# Patient Record
Sex: FEMALE | Race: WHITE | NOT HISPANIC OR LATINO | URBAN - METROPOLITAN AREA
[De-identification: names, ages, dates, MRNs, and addresses within clinical notes are randomized per-mention and may not be internally consistent; named-entity substitution may affect disease eponyms.]

---

## 2017-08-21 ENCOUNTER — INPATIENT (INPATIENT)
Facility: HOSPITAL | Age: 77
LOS: 18 days | Discharge: HOME | End: 2017-09-09
Attending: INTERNAL MEDICINE

## 2017-08-21 DIAGNOSIS — D50.0 IRON DEFICIENCY ANEMIA SECONDARY TO BLOOD LOSS (CHRONIC): ICD-10-CM

## 2017-09-09 PROBLEM — Z00.00 ENCOUNTER FOR PREVENTIVE HEALTH EXAMINATION: Status: ACTIVE | Noted: 2017-09-09

## 2017-09-13 DIAGNOSIS — Z87.891 PERSONAL HISTORY OF NICOTINE DEPENDENCE: ICD-10-CM

## 2017-09-13 DIAGNOSIS — D62 ACUTE POSTHEMORRHAGIC ANEMIA: ICD-10-CM

## 2017-09-13 DIAGNOSIS — K63.5 POLYP OF COLON: ICD-10-CM

## 2017-09-13 DIAGNOSIS — I24.8 OTHER FORMS OF ACUTE ISCHEMIC HEART DISEASE: ICD-10-CM

## 2017-09-13 DIAGNOSIS — D64.9 ANEMIA, UNSPECIFIED: ICD-10-CM

## 2017-09-13 DIAGNOSIS — I21.4 NON-ST ELEVATION (NSTEMI) MYOCARDIAL INFARCTION: ICD-10-CM

## 2017-09-13 DIAGNOSIS — K64.4 RESIDUAL HEMORRHOIDAL SKIN TAGS: ICD-10-CM

## 2017-09-13 DIAGNOSIS — K21.9 GASTRO-ESOPHAGEAL REFLUX DISEASE WITHOUT ESOPHAGITIS: ICD-10-CM

## 2017-09-13 DIAGNOSIS — K57.31 DIVERTICULOSIS OF LARGE INTESTINE WITHOUT PERFORATION OR ABSCESS WITH BLEEDING: ICD-10-CM

## 2017-09-13 DIAGNOSIS — E78.5 HYPERLIPIDEMIA, UNSPECIFIED: ICD-10-CM

## 2017-09-13 DIAGNOSIS — K57.90 DIVERTICULOSIS OF INTESTINE, PART UNSPECIFIED, WITHOUT PERFORATION OR ABSCESS WITHOUT BLEEDING: ICD-10-CM

## 2017-09-13 DIAGNOSIS — I47.1 SUPRAVENTRICULAR TACHYCARDIA: ICD-10-CM

## 2017-09-13 DIAGNOSIS — Z91.14 PATIENT'S OTHER NONCOMPLIANCE WITH MEDICATION REGIMEN: ICD-10-CM

## 2017-09-13 DIAGNOSIS — M17.0 BILATERAL PRIMARY OSTEOARTHRITIS OF KNEE: ICD-10-CM

## 2017-09-13 DIAGNOSIS — I48.91 UNSPECIFIED ATRIAL FIBRILLATION: ICD-10-CM

## 2017-09-13 DIAGNOSIS — Z60.2 PROBLEMS RELATED TO LIVING ALONE: ICD-10-CM

## 2017-09-13 DIAGNOSIS — K52.9 NONINFECTIVE GASTROENTERITIS AND COLITIS, UNSPECIFIED: ICD-10-CM

## 2017-09-13 DIAGNOSIS — I44.7 LEFT BUNDLE-BRANCH BLOCK, UNSPECIFIED: ICD-10-CM

## 2017-09-13 DIAGNOSIS — I25.10 ATHEROSCLEROTIC HEART DISEASE OF NATIVE CORONARY ARTERY WITHOUT ANGINA PECTORIS: ICD-10-CM

## 2017-09-13 DIAGNOSIS — N18.3 CHRONIC KIDNEY DISEASE, STAGE 3 (MODERATE): ICD-10-CM

## 2017-09-13 DIAGNOSIS — R26.9 UNSPECIFIED ABNORMALITIES OF GAIT AND MOBILITY: ICD-10-CM

## 2017-09-13 DIAGNOSIS — K29.70 GASTRITIS, UNSPECIFIED, WITHOUT BLEEDING: ICD-10-CM

## 2017-09-13 DIAGNOSIS — K44.9 DIAPHRAGMATIC HERNIA WITHOUT OBSTRUCTION OR GANGRENE: ICD-10-CM

## 2017-09-13 DIAGNOSIS — I12.9 HYPERTENSIVE CHRONIC KIDNEY DISEASE WITH STAGE 1 THROUGH STAGE 4 CHRONIC KIDNEY DISEASE, OR UNSPECIFIED CHRONIC KIDNEY DISEASE: ICD-10-CM

## 2017-09-13 SDOH — SOCIAL STABILITY - SOCIAL INSECURITY: PROBLEMS RELATED TO LIVING ALONE: Z60.2

## 2021-04-26 ENCOUNTER — APPOINTMENT (OUTPATIENT)
Dept: CARDIOLOGY | Facility: CLINIC | Age: 81
End: 2021-04-26

## 2021-06-29 ENCOUNTER — APPOINTMENT (OUTPATIENT)
Dept: VASCULAR SURGERY | Facility: CLINIC | Age: 81
End: 2021-06-29
Payer: MEDICARE

## 2021-06-29 VITALS
DIASTOLIC BLOOD PRESSURE: 63 MMHG | TEMPERATURE: 97.6 F | HEIGHT: 66 IN | SYSTOLIC BLOOD PRESSURE: 130 MMHG | BODY MASS INDEX: 25.23 KG/M2 | HEART RATE: 85 BPM | WEIGHT: 157 LBS

## 2021-06-29 DIAGNOSIS — I10 ESSENTIAL (PRIMARY) HYPERTENSION: ICD-10-CM

## 2021-06-29 DIAGNOSIS — M19.90 UNSPECIFIED OSTEOARTHRITIS, UNSPECIFIED SITE: ICD-10-CM

## 2021-06-29 DIAGNOSIS — I70.213 ATHEROSCLEROSIS OF NATIVE ARTERIES OF EXTREMITIES WITH INTERMITTENT CLAUDICATION, BILATERAL LEGS: ICD-10-CM

## 2021-06-29 DIAGNOSIS — E78.5 HYPERLIPIDEMIA, UNSPECIFIED: ICD-10-CM

## 2021-06-29 DIAGNOSIS — Z87.891 PERSONAL HISTORY OF NICOTINE DEPENDENCE: ICD-10-CM

## 2021-06-29 PROCEDURE — 99204 OFFICE O/P NEW MOD 45 MIN: CPT

## 2021-06-29 PROCEDURE — 93925 LOWER EXTREMITY STUDY: CPT

## 2021-06-29 RX ORDER — EZETIMIBE 10 MG/1
10 TABLET ORAL
Refills: 0 | Status: ACTIVE | COMMUNITY

## 2021-06-29 RX ORDER — DICLOXACILLIN SODIUM 500 MG/1
CAPSULE ORAL
Refills: 0 | Status: ACTIVE | COMMUNITY

## 2021-06-29 RX ORDER — LOSARTAN POTASSIUM 50 MG/1
50 TABLET, FILM COATED ORAL
Refills: 0 | Status: ACTIVE | COMMUNITY

## 2021-06-29 RX ORDER — FERROUS SULFATE TAB EC 324 MG (65 MG FE EQUIVALENT) 324 (65 FE) MG
TABLET DELAYED RESPONSE ORAL
Refills: 0 | Status: ACTIVE | COMMUNITY

## 2021-06-29 RX ORDER — NIFEDIPINE 30 MG
30 TABLET, EXTENDED RELEASE ORAL
Refills: 0 | Status: ACTIVE | COMMUNITY

## 2021-06-29 RX ORDER — ASPIRIN 81 MG
81 TABLET, DELAYED RELEASE (ENTERIC COATED) ORAL
Refills: 0 | Status: ACTIVE | COMMUNITY

## 2021-06-29 RX ORDER — PNV NO.95/FERROUS FUM/FOLIC AC 28MG-0.8MG
TABLET ORAL
Refills: 0 | Status: ACTIVE | COMMUNITY

## 2021-06-29 RX ORDER — METOPROLOL TARTRATE 50 MG/1
50 TABLET, FILM COATED ORAL
Refills: 0 | Status: ACTIVE | COMMUNITY

## 2021-06-29 RX ORDER — HYDROCHLOROTHIAZIDE 12.5 MG/1
12.5 TABLET ORAL
Refills: 0 | Status: ACTIVE | COMMUNITY

## 2021-06-29 NOTE — HISTORY OF PRESENT ILLNESS
[FreeTextEntry1] : 81 y/o female with h/o osteoarthritis sent in from assisted living facility for evaluation of PVD. She is mostly in wheelchair and sometimes ambulates with walker, c/o knee pain on ambulation. No h/o nonhealing ulcerations.

## 2021-06-29 NOTE — DATA REVIEWED
[FreeTextEntry1] : I performed an arterial duplex which was medically necessary to evaluate for arterial disease. It showed diffuse atherosclerotic plaque without any flow limiting lesions in the lower extremities bilaterally.\par

## 2021-07-02 ENCOUNTER — APPOINTMENT (OUTPATIENT)
Dept: HEMATOLOGY ONCOLOGY | Facility: CLINIC | Age: 81
End: 2021-07-02
Payer: MEDICARE

## 2021-07-02 ENCOUNTER — LABORATORY RESULT (OUTPATIENT)
Age: 81
End: 2021-07-02

## 2021-07-02 VITALS
TEMPERATURE: 98 F | BODY MASS INDEX: 25.34 KG/M2 | WEIGHT: 157 LBS | DIASTOLIC BLOOD PRESSURE: 54 MMHG | HEART RATE: 73 BPM | SYSTOLIC BLOOD PRESSURE: 121 MMHG | RESPIRATION RATE: 14 BRPM

## 2021-07-02 LAB
ALBUMIN SERPL ELPH-MCNC: 4.2 G/DL
ALP BLD-CCNC: 167 U/L
ALT SERPL-CCNC: 7 U/L
ANION GAP SERPL CALC-SCNC: 13 MMOL/L
AST SERPL-CCNC: 15 U/L
BILIRUB SERPL-MCNC: 0.6 MG/DL
BUN SERPL-MCNC: 32 MG/DL
CALCIUM SERPL-MCNC: 9.3 MG/DL
CHLORIDE SERPL-SCNC: 109 MMOL/L
CO2 SERPL-SCNC: 15 MMOL/L
CREAT SERPL-MCNC: 1.2 MG/DL
GLUCOSE SERPL-MCNC: 94 MG/DL
HCT VFR BLD CALC: 26.1 %
HGB BLD-MCNC: 8 G/DL
LDH SERPL-CCNC: 213
MCHC RBC-ENTMCNC: 29.1 PG
MCHC RBC-ENTMCNC: 30.7 G/DL
MCV RBC AUTO: 94.9 FL
PLATELET # BLD AUTO: 414 K/UL
PMV BLD: 9.1 FL
POTASSIUM SERPL-SCNC: 5 MMOL/L
PROT SERPL-MCNC: 6.8 G/DL
RBC # BLD: 2.75 M/UL
RBC # FLD: 17.6 %
RETICS # AUTO: 4.2 %
RETICS AGGREG/RBC NFR: 114.4 K/UL
SODIUM SERPL-SCNC: 137 MMOL/L
WBC # FLD AUTO: 5.87 K/UL

## 2021-07-02 PROCEDURE — 99214 OFFICE O/P EST MOD 30 MIN: CPT

## 2021-07-02 NOTE — ASSESSMENT
[FreeTextEntry1] : 80 year female , COPD , degenerative arthritis , progressive normocytic anemia , slightly symptomatic , no response to iron and B12 supplements and with no evidence of hemolysis or blood loss . \par slightly elevated CEA ( due to COPD ? ) \par Plan : CBC , retic , LDH , myeloma panel , EPO , CMP . \par          consider procrit if work up is negative , refusing bone marrow .

## 2021-07-02 NOTE — PHYSICAL EXAM
[Thin] : thin [Normal] : grossly intact [de-identified] : pale in no acute distress.  [de-identified] : one plus edema  [de-identified] : refused [de-identified] : bilateral knee swelling , no tenderness.

## 2021-07-02 NOTE — HISTORY OF PRESENT ILLNESS
[de-identified] : 80 year old female referred by Dr Rivero for anemia . PMH of DJD , hyperlipidemia , hypertension .COPD .  in April 2021 cbc showed hgb : 9 Ht : 29 , MCV : 94 follow up cbc on 6/8 /2021 shows Hgb : 8.4 Ht : 27 MCV : 92 . ferrtin : 505 . B12 700 , TSH : nl , BUN : 43 Cr : 1.3 alk phos : 126 , CEA : 7 .\par in 2017 she was transfused for microcytic anemia from suspected GI bleeding ( work up negative except for tubular adenoma ) , she has been onpo iron and B12 IM . \par NO interval blood counts available until 2021. \par SYstem review : she complains of bilateral knee pain , leg swelling , uses a walker to ambulate no weight loss , no hematochezia . no change in cough or breathing. \par Health Maintenance : no recent mammogram , last colonoscopy in 2017 . \par Social history : currently in assisted living , fairly independent , able to look after his personal hygiene .

## 2021-07-03 LAB
CEA SERPL-MCNC: 7.4 NG/ML
TSH SERPL-ACNC: 1.19 UIU/ML

## 2021-07-07 LAB
DEPRECATED KAPPA LC FREE/LAMBDA SER: 2.49 RATIO
EPO SERPL-MCNC: 50.9 MIU/ML
KAPPA LC CSF-MCNC: 1.83 MG/DL
KAPPA LC SERPL-MCNC: 4.56 MG/DL

## 2021-07-09 LAB
ALBUMIN MFR SERPL ELPH: 56.6 %
ALBUMIN SERPL-MCNC: 4 G/DL
ALBUMIN/GLOB SERPL: 1.3 RATIO
ALPHA1 GLOB MFR SERPL ELPH: 6.4 %
ALPHA1 GLOB SERPL ELPH-MCNC: 0.4 G/DL
ALPHA2 GLOB MFR SERPL ELPH: 13.7 %
ALPHA2 GLOB SERPL ELPH-MCNC: 1 G/DL
B-GLOBULIN MFR SERPL ELPH: 10.6 %
B-GLOBULIN SERPL ELPH-MCNC: 0.7 G/DL
GAMMA GLOB FLD ELPH-MCNC: 0.9 G/DL
GAMMA GLOB MFR SERPL ELPH: 12.7 %
INTERPRETATION SERPL IEP-IMP: NORMAL
PROT SERPL-MCNC: 7 G/DL
PROT SERPL-MCNC: 7 G/DL

## 2021-07-18 ENCOUNTER — EMERGENCY (EMERGENCY)
Facility: HOSPITAL | Age: 81
LOS: 0 days | Discharge: NURSING, REHAB OR LONG-TERM CARE | End: 2021-07-18
Attending: STUDENT IN AN ORGANIZED HEALTH CARE EDUCATION/TRAINING PROGRAM | Admitting: STUDENT IN AN ORGANIZED HEALTH CARE EDUCATION/TRAINING PROGRAM
Payer: MEDICARE

## 2021-07-18 VITALS
RESPIRATION RATE: 18 BRPM | TEMPERATURE: 97 F | SYSTOLIC BLOOD PRESSURE: 136 MMHG | OXYGEN SATURATION: 100 % | DIASTOLIC BLOOD PRESSURE: 63 MMHG | HEART RATE: 85 BPM

## 2021-07-18 VITALS
SYSTOLIC BLOOD PRESSURE: 140 MMHG | RESPIRATION RATE: 18 BRPM | DIASTOLIC BLOOD PRESSURE: 60 MMHG | TEMPERATURE: 97 F | OXYGEN SATURATION: 100 % | HEART RATE: 77 BPM

## 2021-07-18 DIAGNOSIS — D64.9 ANEMIA, UNSPECIFIED: ICD-10-CM

## 2021-07-18 DIAGNOSIS — M25.561 PAIN IN RIGHT KNEE: ICD-10-CM

## 2021-07-18 DIAGNOSIS — E78.5 HYPERLIPIDEMIA, UNSPECIFIED: ICD-10-CM

## 2021-07-18 DIAGNOSIS — I73.9 PERIPHERAL VASCULAR DISEASE, UNSPECIFIED: ICD-10-CM

## 2021-07-18 DIAGNOSIS — G89.29 OTHER CHRONIC PAIN: ICD-10-CM
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
## 2021-07-18 DIAGNOSIS — M19.90 UNSPECIFIED OSTEOARTHRITIS, UNSPECIFIED SITE: ICD-10-CM

## 2021-07-18 DIAGNOSIS — S00.03XA CONTUSION OF SCALP, INITIAL ENCOUNTER: ICD-10-CM

## 2021-07-18 DIAGNOSIS — I10 ESSENTIAL (PRIMARY) HYPERTENSION: ICD-10-CM
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
## 2021-07-18 DIAGNOSIS — M25.562 PAIN IN LEFT KNEE: ICD-10-CM
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
## 2021-07-18 DIAGNOSIS — Z87.891 PERSONAL HISTORY OF NICOTINE DEPENDENCE: ICD-10-CM

## 2021-07-18 PROCEDURE — 72170 X-RAY EXAM OF PELVIS: CPT | Mod: 26

## 2021-07-18 PROCEDURE — 99285 EMERGENCY DEPT VISIT HI MDM: CPT

## 2021-07-18 PROCEDURE — 71045 X-RAY EXAM CHEST 1 VIEW: CPT | Mod: 26

## 2021-07-18 PROCEDURE — 73562 X-RAY EXAM OF KNEE 3: CPT | Mod: 26,50

## 2021-07-18 PROCEDURE — 72125 CT NECK SPINE W/O DYE: CPT | Mod: 26

## 2021-07-18 PROCEDURE — 70450 CT HEAD/BRAIN W/O DYE: CPT | Mod: 26

## 2021-07-18 RX ORDER — ACETAMINOPHEN 500 MG
975 TABLET ORAL ONCE
Refills: 0 | Status: COMPLETED | OUTPATIENT
Start: 2021-07-18 | End: 2021-07-18

## 2021-07-18 RX ORDER — MORPHINE SULFATE 50 MG/1
4 CAPSULE, EXTENDED RELEASE ORAL ONCE
Refills: 0 | Status: DISCONTINUED | OUTPATIENT
Start: 2021-07-18 | End: 2021-07-18

## 2021-07-18 RX ADMIN — Medication 975 MILLIGRAM(S): at 02:56

## 2021-07-18 NOTE — ED ADULT NURSE NOTE - CHIEF COMPLAINT QUOTE
PT BIBA from [facility] for fall in the bathroom. PT felt a "pop" in her knee and lost her balance and hit the back of her head on the toilet paper coy. PT denies LOC and is on aspirin.

## 2021-07-18 NOTE — ED PROVIDER NOTE - CLINICAL SUMMARY MEDICAL DECISION MAKING FREE TEXT BOX
80 yr old f that presents s/p mechanical fall. imaging and pain meds given. pt informed of all findings. pt discharged with pcp follow up and strict return precautions.

## 2021-07-18 NOTE — ED ADULT TRIAGE NOTE - CHIEF COMPLAINT QUOTE
PT BIBA from [facility] for fall in the bathroom. PT felt a "pop" in her knee and lost her balance and hit the back of her head on the toilet paper coy. PT denies LOC and is on aspirin.

## 2021-07-18 NOTE — ED PROVIDER NOTE - OBJECTIVE STATEMENT
The pt is a 80y F w/ hx of HTN, HLD, anemia, PVD, arthritis affecting both knees presenting s/p mechanical fall at NH. Pt uses a walker at baseline, states she was walking into the bathroom and her knee popped and gave out causing her to fall. She hit the back of her head against the toilet paper coy. No LOC, not on AC, remembers all events. Denies headache, blurry vision, N/V. No headache, chest pain, SOB, abdominal pain, diarrhea, dysuria/hematuria.

## 2021-07-18 NOTE — ED PROVIDER NOTE - ATTENDING CONTRIBUTION TO CARE
80 yr old f w/ a pmh significant for htn, hld, anemia, pvd, who presents s/p mechanical fall. Pt states that she was walking with a walker towards the bathroom when her "knees gave out" and she fell. Pt states that she hit the back of her head against a toilet paper coy. Pt denies any LOC, chest pain, headache, nausea, vomiting, fevers, chills or any other complaints.     VITAL SIGNS: I have reviewed nursing notes and confirm.  CONSTITUTIONAL: non-toxic, well appearing  SKIN: no rash, no petechiae.  EYES: EOMI, pink conjunctiva, anicteric  ENT: tongue midline, no exudates, MMM  NECK: Supple; no meningismus, no JVD  CARD: RRR, no murmurs, equal radial pulses bilaterally 2+  RESP: CTAB, no respiratory distress  ABD: Soft, non-tender, non-distended, no peritoneal signs, no HSM, no CVA tenderness  EXT: Normal ROM x4. pt has tenderness on palpation of the knees, bilaterally (pt states that the pain is at its baseline). no tenderness on palpation of the cervical/thorarcic/lumbar spine-midline, no ecchymosis, step offs or any other complaints.   NEURO: Alert, oriented. CN2-12 intact, equal strength bilaterally.  PSYCH: Cooperative, appropriate.    a/p  80 yr old f that presents s/p mechanical fall   -imaging  -pain management  -dispo pending

## 2021-07-18 NOTE — ED PROVIDER NOTE - PATIENT PORTAL LINK FT
You can access the FollowMyHealth Patient Portal offered by [facility] by registering at the following website: [email or web address]. By joining [employer]’s FollowMyHealth portal, you will also be able to view your health information using other applications (apps) compatible with our system.

## 2021-07-18 NOTE — ED PROVIDER NOTE - CARE PLAN
Principal Discharge DX:	Fall  Secondary Diagnosis:	Scalp hematoma  Secondary Diagnosis:	Knee pain, chronic

## 2021-07-18 NOTE — ED PROVIDER NOTE - NSFOLLOWUPINSTRUCTIONS_ED_ALL_ED_FT
Fall Prevention in the Home    Falls can cause injuries. They can happen to people of all ages. There are many things you can do to make your home safe and to help prevent falls.     WHAT CAN I DO ON THE OUTSIDE OF MY HOME?  Regularly fix the edges of walkways and driveways and fix any cracks.  Remove anything that might make you trip as you walk through a door, such as a raised step or threshold.  Trim any bushes or trees on the path to your home.  Use bright outdoor lighting.  Clear any walking paths of anything that might make someone trip, such as rocks or tools.  Regularly check to see if handrails are loose or broken. Make sure that both sides of any steps have handrails.  Any raised decks and porches should have guardrails on the edges.  Have any leaves, snow, or ice cleared regularly.  Use sand or salt on walking paths during winter.  Clean up any spills in your garage right away. This includes oil or grease spills.    WHAT CAN I DO IN THE BATHROOM?  Use night lights.  Install grab bars by the toilet and in the tub and shower. Do not use towel bars as grab bars.  Use non-skid mats or decals in the tub or shower.  If you need to sit down in the shower, use a plastic, non-slip stool.  Keep the floor dry. Clean up any water that spills on the floor as soon as it happens.  Remove soap buildup in the tub or shower regularly.  Attach bath mats securely with double-sided non-slip rug tape.  Do not have throw rugs and other things on the floor that can make you trip.    WHAT CAN I DO IN THE BEDROOM?  Use night lights.  Make sure that you have a light by your bed that is easy to reach.  Do not use any sheets or blankets that are too big for your bed. They should not hang down onto the floor.  Have a firm chair that has side arms. You can use this for support while you get dressed.  Do not have throw rugs and other things on the floor that can make you trip.    WHAT CAN I DO IN THE KITCHEN?  Clean up any spills right away.  Avoid walking on wet floors.  Keep items that you use a lot in easy-to-reach places.  If you need to reach something above you, use a strong step stool that has a grab bar.  Keep electrical cords out of the way.  Do not use floor polish or wax that makes floors slippery. If you must use wax, use non-skid floor wax.  Do not have throw rugs and other things on the floor that can make you trip.    WHAT CAN I DO WITH MY STAIRS?  Do not leave any items on the stairs.  Make sure that there are handrails on both sides of the stairs and use them. Fix handrails that are broken or loose. Make sure that handrails are as long as the stairways.  Check any carpeting to make sure that it is firmly attached to the stairs. Fix any carpet that is loose or worn.  Avoid having throw rugs at the top or bottom of the stairs. If you do have throw rugs, attach them to the floor with carpet tape.  Make sure that you have a light switch at the top of the stairs and the bottom of the stairs. If you do not have them, ask someone to add them for you.    WHAT ELSE CAN I DO TO HELP PREVENT FALLS?  Wear shoes that:  Do not have high heels.  Have rubber bottoms.  Are comfortable and fit you well.  Are closed at the toe. Do not wear sandals.  If you use a stepladder:  Make sure that it is fully opened. Do not climb a closed stepladder.  Make sure that both sides of the stepladder are locked into place.  Ask someone to hold it for you, if possible.  Clearly scarlett and make sure that you can see:  Any grab bars or handrails.  First and last steps.  Where the edge of each step is.  Use tools that help you move around (mobility aids) if they are needed. These include:  Canes.  Walkers.  Scooters.  Crutches.  Turn on the lights when you go into a dark area. Replace any light bulbs as soon as they burn out.  Set up your furniture so you have a clear path. Avoid moving your furniture around.  If any of your floors are uneven, fix them.  If there are any pets around you, be aware of where they are.  Review your medicines with your doctor. Some medicines can make you feel dizzy. This can increase your chance of falling.    Ask your doctor what other things that you can do to help prevent falls.    ADDITIONAL NOTES AND INSTRUCTIONS    Please follow up with your Primary MD in 24-48 hr.  Seek immediate medical care for any new/worsening signs or symptoms.

## 2021-07-18 NOTE — ED PROVIDER NOTE - NS ED ROS FT
Constitutional: (-) fever, (+) fall   Eyes/ENT: (-) blurry vision, (-) epistaxis  Cardiovascular: (-) chest pain, (-) syncope  Respiratory: (-) cough, (-) shortness of breath  Gastrointestinal: (-) vomiting, (-) diarrhea  Musculoskeletal: (-) neck pain, (-) back pain, (+) chronic BL knee pain   Integumentary: (-) rash, (-) edema  Neurological: (-) headache, (-) altered mental status  Psychiatric: (-) hallucinations  Allergic/Immunologic: (-) pruritus

## 2021-07-18 NOTE — ED PROVIDER NOTE - NSFOLLOWUPCLINICS_GEN_ALL_ED_FT
[facility] ([facility])  Rehabilitation  [facility] [address] [place] [address]  Phone: [phone]  Fax:   Follow Up Time: 7-10 Days

## 2021-07-18 NOTE — ED ADULT NURSE NOTE - OBJECTIVE STATEMENT
As per pt, "My knees gave out and I fell. I hit the back of my head on the toilet paper coy." Denies LOC.

## 2021-07-18 NOTE — ED PROVIDER NOTE - PHYSICAL EXAMINATION
Vital Signs: I have reviewed the initial vital signs.  Constitutional: well-nourished, appears stated age, no acute distress  HEENT: (+) small occipital hematoma with no laceration   Cardiovascular: regular rate, regular rhythm, well-perfused extremities  Respiratory: unlabored respiratory effort, clear to auscultation bilaterally  Gastrointestinal: soft, non-tender abdomen  Musculoskeletal: supple neck, no lower extremity edema. No cervical, thoracic, lumbar midline or paraspinal tenderness. Clavicles intact. No chest wall tenderness. Pelvis stable, non-tender. (+) BL knee tenderness which pt states isn't changed or new from prior  Integumentary: warm, dry, no rash  Neurologic: awake, alert, extremities’ motor and sensory functions grossly intact  Psychiatric: appropriate mood, appropriate affect

## 2021-07-26 ENCOUNTER — OUTPATIENT (OUTPATIENT)
Dept: OUTPATIENT SERVICES | Facility: HOSPITAL | Age: 81
LOS: 1 days | Discharge: HOME | End: 2021-07-26

## 2021-07-26 ENCOUNTER — APPOINTMENT (OUTPATIENT)
Dept: HEMATOLOGY ONCOLOGY | Facility: CLINIC | Age: 81
End: 2021-07-26
Payer: MEDICARE

## 2021-07-26 VITALS
RESPIRATION RATE: 14 BRPM | HEIGHT: 66 IN | DIASTOLIC BLOOD PRESSURE: 60 MMHG | WEIGHT: 157 LBS | SYSTOLIC BLOOD PRESSURE: 121 MMHG | HEART RATE: 74 BPM | TEMPERATURE: 98.7 F | BODY MASS INDEX: 25.23 KG/M2

## 2021-07-26 DIAGNOSIS — D64.9 ANEMIA, UNSPECIFIED: ICD-10-CM

## 2021-07-26 PROCEDURE — 99214 OFFICE O/P EST MOD 30 MIN: CPT

## 2021-07-26 NOTE — PHYSICAL EXAM
[Thin] : thin [Normal] : grossly intact [de-identified] : pale in no acute distress.  [de-identified] : one plus edema  [de-identified] : refused [de-identified] : bilateral knee swelling , no tenderness.

## 2021-07-26 NOTE — HISTORY OF PRESENT ILLNESS
[de-identified] : 10 year old female referred by Dr Rivero for anemia. PMH of DJD , hyperlipidemia , hypertension.COPD. in April 2021 cbc showed hgb : 9 Ht : 29 , MCV : 94 follow up cbc on 6/8 /2021 shows Hgb : 8.4 Ht : 27 MCV : 92. ferrtin : 505. B12 700 , TSH : nl , BUN : 43 Cr : 1.3 alk phos : 126 , CEA : 7.\par in 2017 she was transfused for microcytic anemia from suspected GI bleeding ( work up negative except for tubular adenoma ) , she has been onpo iron and B12 IM. \par NO interval blood counts available until 2021. \par SYstem review : she complains of bilateral knee pain , leg swelling , uses a walker to ambulate no weight loss , no hematochezia. no change in cough or breathing. \par Health Maintenance : no recent mammogram , last colonoscopy in 2017. \par Social history : currently in assisted living , fairly independent , able to look after his personal hygiene. \par \par  [de-identified] : 07/26/2021 Patient returns for normocytic anemia , work up was significant for GFR : 42 , EPO : 50 , retic : 4 % . she continues to complain of mild fatigue . \par Blood work done on 7/23 showed Hgb : 9.3 , MCV : 100 , hapto : 175 , coomb's : negative .

## 2021-07-26 NOTE — ASSESSMENT
[FreeTextEntry1] : 80 year female , COPD , degenerative arthritis , normocytic anemia , slightly symptomatic , \par Hgb up to 9.3 ( rule out recent GI bleeding ? ) \par anemia of CKD ? MDS ? \par slightly elevated CEA ( due to COPD ? ) \par Plan :  repeat cbc , retic , ferritin in one month .\par           consider procrit or aranesp .

## 2022-01-04 ENCOUNTER — APPOINTMENT (OUTPATIENT)
Dept: VASCULAR SURGERY | Facility: CLINIC | Age: 82
End: 2022-01-04

## 2022-06-26 ENCOUNTER — EMERGENCY (EMERGENCY)
Facility: HOSPITAL | Age: 82
LOS: 0 days | Discharge: NURSING, REHAB OR LONG-TERM CARE | End: 2022-06-26
Attending: EMERGENCY MEDICINE | Admitting: EMERGENCY MEDICINE
Payer: MEDICARE

## 2022-06-26 VITALS
TEMPERATURE: 98 F | DIASTOLIC BLOOD PRESSURE: 65 MMHG | WEIGHT: 123.9 LBS | HEART RATE: 65 BPM | SYSTOLIC BLOOD PRESSURE: 157 MMHG | RESPIRATION RATE: 18 BRPM | OXYGEN SATURATION: 97 %

## 2022-06-26 VITALS
DIASTOLIC BLOOD PRESSURE: 60 MMHG | OXYGEN SATURATION: 98 % | RESPIRATION RATE: 18 BRPM | HEART RATE: 64 BPM | TEMPERATURE: 98 F | SYSTOLIC BLOOD PRESSURE: 143 MMHG

## 2022-06-26 DIAGNOSIS — R53.1 WEAKNESS: ICD-10-CM

## 2022-06-26 DIAGNOSIS — R50.9 FEVER, UNSPECIFIED: ICD-10-CM

## 2022-06-26 DIAGNOSIS — I44.7 LEFT BUNDLE-BRANCH BLOCK, UNSPECIFIED: ICD-10-CM

## 2022-06-26 DIAGNOSIS — E86.0 DEHYDRATION: ICD-10-CM

## 2022-06-26 DIAGNOSIS — Z79.82 LONG TERM (CURRENT) USE OF ASPIRIN: ICD-10-CM

## 2022-06-26 DIAGNOSIS — M79.10 MYALGIA, UNSPECIFIED SITE: ICD-10-CM

## 2022-06-26 LAB
ALBUMIN SERPL ELPH-MCNC: 4.4 G/DL — SIGNIFICANT CHANGE UP (ref 3.5–5.2)
ALP SERPL-CCNC: 136 U/L — HIGH (ref 30–115)
ALT FLD-CCNC: 6 U/L — SIGNIFICANT CHANGE UP (ref 0–41)
ANION GAP SERPL CALC-SCNC: 16 MMOL/L — HIGH (ref 7–14)
AST SERPL-CCNC: 14 U/L — SIGNIFICANT CHANGE UP (ref 0–41)
BASOPHILS # BLD AUTO: 0.01 K/UL — SIGNIFICANT CHANGE UP (ref 0–0.2)
BASOPHILS NFR BLD AUTO: 0.1 % — SIGNIFICANT CHANGE UP (ref 0–1)
BILIRUB SERPL-MCNC: 0.3 MG/DL — SIGNIFICANT CHANGE UP (ref 0.2–1.2)
BUN SERPL-MCNC: 45 MG/DL — HIGH (ref 10–20)
CALCIUM SERPL-MCNC: 9.1 MG/DL — SIGNIFICANT CHANGE UP (ref 8.5–10.1)
CHLORIDE SERPL-SCNC: 109 MMOL/L — SIGNIFICANT CHANGE UP (ref 98–110)
CO2 SERPL-SCNC: 13 MMOL/L — LOW (ref 17–32)
CREAT SERPL-MCNC: 1.6 MG/DL — HIGH (ref 0.7–1.5)
EGFR: 32 ML/MIN/1.73M2 — LOW
EOSINOPHIL # BLD AUTO: 0 K/UL — SIGNIFICANT CHANGE UP (ref 0–0.7)
EOSINOPHIL NFR BLD AUTO: 0 % — SIGNIFICANT CHANGE UP (ref 0–8)
GLUCOSE SERPL-MCNC: 115 MG/DL — HIGH (ref 70–99)
HCT VFR BLD CALC: 31.9 % — LOW (ref 37–47)
HGB BLD-MCNC: 9.9 G/DL — LOW (ref 12–16)
IMM GRANULOCYTES NFR BLD AUTO: 0.6 % — HIGH (ref 0.1–0.3)
LYMPHOCYTES # BLD AUTO: 0.51 K/UL — LOW (ref 1.2–3.4)
LYMPHOCYTES # BLD AUTO: 4.7 % — LOW (ref 20.5–51.1)
MAGNESIUM SERPL-MCNC: 2 MG/DL — SIGNIFICANT CHANGE UP (ref 1.8–2.4)
MCHC RBC-ENTMCNC: 29.9 PG — SIGNIFICANT CHANGE UP (ref 27–31)
MCHC RBC-ENTMCNC: 31 G/DL — LOW (ref 32–37)
MCV RBC AUTO: 96.4 FL — SIGNIFICANT CHANGE UP (ref 81–99)
MONOCYTES # BLD AUTO: 1.03 K/UL — HIGH (ref 0.1–0.6)
MONOCYTES NFR BLD AUTO: 9.5 % — HIGH (ref 1.7–9.3)
NEUTROPHILS # BLD AUTO: 9.22 K/UL — HIGH (ref 1.4–6.5)
NEUTROPHILS NFR BLD AUTO: 85.1 % — HIGH (ref 42.2–75.2)
NRBC # BLD: 0 /100 WBCS — SIGNIFICANT CHANGE UP (ref 0–0)
PLATELET # BLD AUTO: 316 K/UL — SIGNIFICANT CHANGE UP (ref 130–400)
POTASSIUM SERPL-MCNC: 5.2 MMOL/L — HIGH (ref 3.5–5)
POTASSIUM SERPL-SCNC: 5.2 MMOL/L — HIGH (ref 3.5–5)
PROT SERPL-MCNC: 6.6 G/DL — SIGNIFICANT CHANGE UP (ref 6–8)
RBC # BLD: 3.31 M/UL — LOW (ref 4.2–5.4)
RBC # FLD: 14.6 % — HIGH (ref 11.5–14.5)
SODIUM SERPL-SCNC: 138 MMOL/L — SIGNIFICANT CHANGE UP (ref 135–146)
WBC # BLD: 10.83 K/UL — HIGH (ref 4.8–10.8)
WBC # FLD AUTO: 10.83 K/UL — HIGH (ref 4.8–10.8)

## 2022-06-26 PROCEDURE — 99284 EMERGENCY DEPT VISIT MOD MDM: CPT

## 2022-06-26 PROCEDURE — 93010 ELECTROCARDIOGRAM REPORT: CPT | Mod: 76

## 2022-06-26 RX ORDER — METOPROLOL TARTRATE 50 MG
1 TABLET ORAL
Qty: 0 | Refills: 0 | DISCHARGE

## 2022-06-26 RX ORDER — IBUPROFEN 200 MG
600 TABLET ORAL ONCE
Refills: 0 | Status: COMPLETED | OUTPATIENT
Start: 2022-06-26 | End: 2022-06-26

## 2022-06-26 RX ORDER — SODIUM CHLORIDE 9 MG/ML
1000 INJECTION, SOLUTION INTRAVENOUS ONCE
Refills: 0 | Status: COMPLETED | OUTPATIENT
Start: 2022-06-26 | End: 2022-06-26

## 2022-06-26 RX ORDER — ASPIRIN/CALCIUM CARB/MAGNESIUM 324 MG
1 TABLET ORAL
Qty: 0 | Refills: 0 | DISCHARGE

## 2022-06-26 RX ORDER — EZETIMIBE 10 MG/1
1 TABLET ORAL
Qty: 0 | Refills: 0 | DISCHARGE

## 2022-06-26 RX ORDER — FERROUS SULFATE 325(65) MG
0 TABLET ORAL
Qty: 0 | Refills: 0 | DISCHARGE

## 2022-06-26 RX ADMIN — SODIUM CHLORIDE 1000 MILLILITER(S): 9 INJECTION, SOLUTION INTRAVENOUS at 12:59

## 2022-06-26 RX ADMIN — Medication 600 MILLIGRAM(S): at 12:55

## 2022-06-26 NOTE — ED PROVIDER NOTE - PATIENT PORTAL LINK FT
You can access the FollowMyHealth Patient Portal offered by [facility] by registering at the following website: [email or web address]. By joining [employer]’s FollowMyHealth portal, you will also be able to view your health information using other applications (apps) compatible with our system.

## 2022-06-26 NOTE — ED ADULT NURSE NOTE - DRUG PRE-SCREENING (DAST -1)
Statement Selected Wartpeel Counseling:  I discussed with the patient the risks of Wartpeel including but not limited to erythema, scaling, itching, weeping, crusting, and pain.

## 2022-06-26 NOTE — ED PROVIDER NOTE - OBJECTIVE STATEMENT
80 y/o female presents to the Ed for evaluation of myalgias and weakness after receiving covid booster 3 days ago. patient with recent covid infection one month ago. patient unable to get off toilet this am due to weakness. patient denies any vomiting or diarrhea. no chest pain, sob, back pain. no headache or neck pain. patient c/o intermittent chills. no rashes, calf swelling

## 2022-06-26 NOTE — ED PROVIDER NOTE - CLINICAL SUMMARY MEDICAL DECISION MAKING FREE TEXT BOX
Pt presents with side effects of the COVID vaccine. Main issue was dehydration. PT felt better after hydration. Advised tylenol and hydration.

## 2022-06-26 NOTE — ED PROVIDER NOTE - NSFOLLOWUPINSTRUCTIONS_ED_ALL_ED_FT
Dehydration, Adult  ImageDehydration is a condition in which there is not enough fluid or water in the body. This happens when you lose more fluids than you take in. Important organs, such as the kidneys, brain, and heart, cannot function without a proper amount of fluids. Any loss of fluids from the body can lead to dehydration.    Dehydration can range from mild to severe. This condition should be treated right away to prevent it from becoming severe.    What are the causes?  This condition may be caused by:    Vomiting.  Diarrhea.  Excessive sweating, such as from heat exposure or exercise.  Not drinking enough fluid, especially:    When ill.  While doing activity that requires a lot of energy.    Excessive urination.  Fever.  Infection.  Certain medicines, such as medicines that cause the body to lose excess fluid (diuretics).  Inability to access safe drinking water.  Reduced physical ability to get adequate water and food.    What increases the risk?  This condition is more likely to develop in people:    Who have a poorly controlled long-term (chronic) illness, such as diabetes, heart disease, or kidney disease.  Who are age 65 or older.  Who are disabled.  Who live in a place with high altitude.  Who play endurance sports.    What are the signs or symptoms?  Symptoms of mild dehydration may include:     Thirst.  Dry lips.  Slightly dry mouth.  Dry, warm skin.  Dizziness.  Symptoms of moderate dehydration may include:     Very dry mouth.  Muscle cramps.  Dark urine. Urine may be the color of tea.  Decreased urine production.  Decreased tear production.  Heartbeat that is irregular or faster than normal (palpitations).  Headache.  Light-headedness, especially when you stand up from a sitting position.  Fainting (syncope).  Symptoms of severe dehydration may include:     Changes in skin, such as:    Cold and clammy skin.  Blotchy (mottled) or pale skin.  Skin that does not quickly return to normal after being lightly pinched and released (poor skin turgor).    Changes in body fluids, such as:    Extreme thirst.  No tear production.  Inability to sweat when body temperature is high, such as in hot weather.  Very little urine production.    Changes in vital signs, such as:    Weak pulse.  Pulse that is more than 100 beats a minute when sitting still.  Rapid breathing.  Low blood pressure.    Other changes, such as:    Sunken eyes.  Cold hands and feet.  Confusion.  Lack of energy (lethargy).  Difficulty waking up from sleep.  Short-term weight loss.  Unconsciousness.    How is this diagnosed?  This condition is diagnosed based on your symptoms and a physical exam. Blood and urine tests may be done to help confirm the diagnosis.    How is this treated?  Treatment for this condition depends on the severity. Mild or moderate dehydration can often be treated at home. Treatment should be started right away. Do not wait until dehydration becomes severe. Severe dehydration is an emergency and it needs to be treated in a hospital.    Treatment for mild dehydration may include:     Drinking more fluids.  Replacing salts and minerals in your blood (electrolytes) that you may have lost.  Treatment for moderate dehydration may include:     Drinking an oral rehydration solution (ORS). This is a drink that helps you replace fluids and electrolytes (rehydrate). It can be found at pharmacies and retail stores.  Treatment for severe dehydration may include:     Receiving fluids through an IV tube.  Receiving an electrolyte solution through a feeding tube that is passed through your nose and into your stomach (nasogastric tube, or NG tube).  Correcting any abnormalities in electrolytes.  Treating the underlying cause of dehydration.  Follow these instructions at home:  If directed by your health care provider, drink an ORS:    Make an ORS by following instructions on the package.  Start by drinking small amounts, about ½ cup (120 mL) every 5–10 minutes.  Slowly increase how much you drink until you have taken the amount recommended by your health care provider.    Drink enough clear fluid to keep your urine clear or pale yellow. If you were told to drink an ORS, finish the ORS first, then start slowly drinking other clear fluids. Drink fluids such as:    Water. Do not drink only water. Doing that can lead to having too little salt (sodium) in the body (hyponatremia).  Ice chips.  Fruit juice that you have added water to (diluted fruit juice).  Low-calorie sports drinks.    Avoid:    Alcohol.  Drinks that contain a lot of sugar. These include high-calorie sports drinks, fruit juice that is not diluted, and soda.  Caffeine.  Foods that are greasy or contain a lot of fat or sugar.    ImageTake over-the-counter and prescription medicines only as told by your health care provider.  Do not take sodium tablets. This can lead to having too much sodium in the body (hypernatremia).  Eat foods that contain a healthy balance of electrolytes, such as bananas, oranges, potatoes, tomatoes, and spinach.  Keep all follow-up visits as told by your health care provider. This is important.  Contact a health care provider if:  You have abdominal pain that:    Gets worse.  Stays in one area (localizes).    You have a rash.  You have a stiff neck.  You are more irritable than usual.  You are sleepier or more difficult to wake up than usual.  You feel weak or dizzy.  You feel very thirsty.  You have urinated only a small amount of very dark urine over 6–8 hours.  Get help right away if:  You have symptoms of severe dehydration.  You cannot drink fluids without vomiting.  Your symptoms get worse with treatment.  You have a fever.  You have a severe headache.  You have vomiting or diarrhea that:    Gets worse.  Does not go away.    You have blood or green matter (bile) in your vomit.  You have blood in your stool. This may cause stool to look black and tarry.  You have not urinated in 6–8 hours.  You faint.  Your heart rate while sitting still is over 100 beats a minute.  You have trouble breathing.  This information is not intended to replace advice given to you by your health care provider. Make sure you discuss any questions you have with your health care provider.

## 2022-06-26 NOTE — ED PROVIDER NOTE - NS ED ROS FT
Review of Systems    Constitutional: (-) fever/ (+)chills (-)loss of appetite  Eyes (-) visual changes  ENT: (-) epistaxis (-) sore throat (-) ear pain  Cardiovascular: (-) chest pain, (-) syncope (-) palpitations  Respiratory: (-) cough, (-) shortness of breath  Gastrointestinal: (-) vomiting, (-) diarrhea (-) abdominal pain  : (-) dysuria , hematuria   neck: (-) neck pain or stiffness  Musculoskeletal:  (-) back pain, (-) joint pain   Integumentary: (-) rash, (-) swelling  Neurological: (-) headache, (-) altered mental status

## 2022-06-26 NOTE — ED PROVIDER NOTE - PHYSICAL EXAMINATION
Vital Signs: I have reviewed the initial vital signs.  Constitutional: well-nourished, no acute distress, normocephalic  Eyes: PERRLA, EOMI,, clear conjunctiva  ENT: MMM,  Cardiovascular: regular rate, regular rhythm, no murmur appreciated  Respiratory: unlabored respiratory effort, clear to auscultation bilaterally  Gastrointestinal: soft, non-tender, non-distended  abdomen, no pulsatile mass  Musculoskeletal: supple neck, no lower extremity edema, no bony tenderness  Integumentary: warm, dry, no rash  Neurologic: awake, alert, cranial nerves II-XII grossly intact, extremities’ motor and sensory functions grossly intact, no focal deficits

## 2022-06-26 NOTE — ED PROVIDER NOTE - ATTENDING APP SHARED VISIT CONTRIBUTION OF CARE
Pt reports she received her 4th pfizer vaccine and since has been having body aches, weakness, feverish, pain at the injection site and poor appetite.  no nausea, vomiting, or diarrhea. On exam S1S2 rrr, + 2\6 diastolic murmur, lungs clear, no JVD, abdomen is soft nontender. Ext neg for edema. NO rash. Left deltoid mild tenderness and mild erythema. Neuro intact. History of anemia.

## 2022-06-26 NOTE — ED PROVIDER NOTE - NS ED ATTENDING STATEMENT MOD
This was a shared visit with the [facility]. I reviewed and verified the documentation and independently performed the documented:

## 2022-07-06 PROBLEM — M19.90 UNSPECIFIED OSTEOARTHRITIS, UNSPECIFIED SITE: Chronic | Status: ACTIVE | Noted: 2022-06-26

## 2022-07-08 ENCOUNTER — APPOINTMENT (OUTPATIENT)
Dept: ORTHOPEDIC SURGERY | Facility: CLINIC | Age: 82
End: 2022-07-08

## 2022-07-08 PROCEDURE — 73560 X-RAY EXAM OF KNEE 1 OR 2: CPT | Mod: 50

## 2022-07-08 PROCEDURE — 99203 OFFICE O/P NEW LOW 30 MIN: CPT

## 2022-07-08 NOTE — PHYSICAL EXAM
[FreeTextEntry3] :  15-20 degrees valgus bilaterally 20° short of full extension flexion 80° calf soft no erythema

## 2022-07-08 NOTE — DATA REVIEWED
[FreeTextEntry1] :  x-rays left and right knee show severe arthritis along the femoral tibial areas with valgus alignment bilaterally

## 2022-07-08 NOTE — REASON FOR VISIT
[FreeTextEntry2] : Bilateral knee pain lives at a good [facility] comes in with her daughter today in a wheelchair

## 2022-07-08 NOTE — HISTORY OF PRESENT ILLNESS
[de-identified] :  pain with ambulation had injections of cortisone with no relief as well as anti-inflammatories currently 81 years old when she was 41 she weighs 310 lb during the right now proximally 150

## 2022-07-08 NOTE — ASSESSMENT
[FreeTextEntry1] :  discussed operative and non operative she is not interested in surgery and a mention gel injections and I agree she has not had gel so recommend gel injections of both left and right knees for the severe arthritis she has failed other modalities such as nonsteroidal anti-inflammatories and cortisone shot and therapy

## 2022-08-16 ENCOUNTER — APPOINTMENT (OUTPATIENT)
Dept: ORTHOPEDIC SURGERY | Facility: CLINIC | Age: 82
End: 2022-08-16

## 2022-08-16 PROCEDURE — 99214 OFFICE O/P EST MOD 30 MIN: CPT | Mod: 25

## 2022-08-16 PROCEDURE — 20611 DRAIN/INJ JOINT/BURSA W/US: CPT | Mod: 50

## 2022-08-16 RX ORDER — DICLOFENAC SODIUM 1% 10 MG/G
1 GEL TOPICAL
Qty: 100 | Refills: 0 | Status: ACTIVE | COMMUNITY
Start: 2022-01-13

## 2022-08-16 NOTE — DISCUSSION/SUMMARY
[de-identified] :   Today I recommend a Synvisc injection for each knee.  The patient agreed.  Both knees were injected with Synvisc, procedure note generated.  This was the 1st injection of Synvisc for each knee.  I will see her back in a week for further evaluation.\par \par Supervising physician:

## 2022-08-16 NOTE — PHYSICAL EXAM
[Bilateral] : knee bilaterally [NL (0)] : extension 0 degrees [] : light touch is intact throughout [FreeTextEntry9] :  Range of motion assessed with the patient sitting up in her wheelchair. [TWNoteComboBox7] : flexion 90 degrees

## 2022-08-16 NOTE — HISTORY OF PRESENT ILLNESS
[de-identified] : The patient is an 81-year-old female accompanied by her daughter here for a subsequent re-evaluation of bilateral knee osteoarthritis.  She is still having pain in each knee.

## 2022-08-16 NOTE — PROCEDURE
[Large Joint Injection] : Large joint injection [Bilateral] : bilaterally of the [Knee] : knee [Synvisc] : Synvisc [#1] : series #1 [Risks, benefits, alternatives discussed / Verbal consent obtained] : the risks benefits, and alternatives have been discussed, and verbal consent was obtained [All ultrasound images have been permanently captured and stored accordingly in our picture archiving and communication system] : All ultrasound images have been permanently captured and stored accordingly in our picture archiving and communication system [Visualization of the needle and placement of injection was performed without complication] : visualization of the needle and placement of injection was performed without complication

## 2022-08-23 ENCOUNTER — APPOINTMENT (OUTPATIENT)
Dept: ORTHOPEDIC SURGERY | Facility: CLINIC | Age: 82
End: 2022-08-23

## 2022-08-23 PROCEDURE — 20611 DRAIN/INJ JOINT/BURSA W/US: CPT | Mod: 50

## 2022-08-23 NOTE — DISCUSSION/SUMMARY
[de-identified] :   Today I recommend a Synvisc injection for each knee.  The patient agreed.  Both knees were injected with Synvisc, procedure note generated.  This was the 2nd injection of Synvisc for each knee.  I will see her back in a week for further evaluation.\par \par Supervising physician:  Dr. Webber

## 2022-08-23 NOTE — PHYSICAL EXAM
[Bilateral] : knee bilaterally [NL (0)] : extension 0 degrees [] : no erythema [FreeTextEntry9] :  Range of motion assessed with the patient sitting up in her wheelchair. [TWNoteComboBox7] : flexion 90 degrees

## 2022-08-23 NOTE — HISTORY OF PRESENT ILLNESS
[de-identified] : The patient is an 81-year-old female accompanied by her daughter here for subsequent re-evaluation of bilateral knee osteoarthritis.

## 2022-08-23 NOTE — PROCEDURE
[Large Joint Injection] : Large joint injection [Bilateral] : bilaterally of the [Knee] : knee [Synvisc] : Synvisc [Risks, benefits, alternatives discussed / Verbal consent obtained] : the risks benefits, and alternatives have been discussed, and verbal consent was obtained [All ultrasound images have been permanently captured and stored accordingly in our picture archiving and communication system] : All ultrasound images have been permanently captured and stored accordingly in our picture archiving and communication system [Visualization of the needle and placement of injection was performed without complication] : visualization of the needle and placement of injection was performed without complication [#2] : series #2

## 2022-08-29 ENCOUNTER — APPOINTMENT (OUTPATIENT)
Dept: ORTHOPEDIC SURGERY | Facility: CLINIC | Age: 82
End: 2022-08-29

## 2022-08-29 PROCEDURE — 20611 DRAIN/INJ JOINT/BURSA W/US: CPT | Mod: 50

## 2022-08-29 NOTE — PROCEDURE
[Large Joint Injection] : Large joint injection [Bilateral] : bilaterally of the [Knee] : knee [Synvisc] : Synvisc [#3] : series #3 [Risks, benefits, alternatives discussed / Verbal consent obtained] : the risks benefits, and alternatives have been discussed, and verbal consent was obtained [All ultrasound images have been permanently captured and stored accordingly in our picture archiving and communication system] : All ultrasound images have been permanently captured and stored accordingly in our picture archiving and communication system [Visualization of the needle and placement of injection was performed without complication] : visualization of the needle and placement of injection was performed without complication

## 2022-08-29 NOTE — DISCUSSION/SUMMARY
[de-identified] :   Today I recommend a Synvisc injection for each knee.  The patient agreed.  Both knees were injected with Synvisc, procedure note generated.  This was the 3rd injection of Synvisc for each knee.  I will see her back in 6 months for further evaluation.\par \par Supervising physician:  Dr. Nicole

## 2022-08-29 NOTE — HISTORY OF PRESENT ILLNESS
[de-identified] : The patient is an 81-year-old female accompanied by her daughter here for subsequent re-evaluation of bilateral knee osteoarthritis.

## 2022-08-30 ENCOUNTER — APPOINTMENT (OUTPATIENT)
Dept: ORTHOPEDIC SURGERY | Facility: CLINIC | Age: 82
End: 2022-08-30

## 2022-09-13 NOTE — ASSESSMENT
[FreeTextEntry1] : 81 y/o female with pain and swelling in the knees bilaterally.\par \par No significant arterial insufficiency on duplex today. Her knee pain is related to arthritis in the joint and may benefit from Orthopedic evaluation.\par \par No vascular surgical intervention is needed at this time. I will see her back in six months time. Hatchet Flap Text: The defect edges were debeveled with a #15 scalpel blade.  Given the location of the defect, shape of the defect and the proximity to free margins a hatchet flap was deemed most appropriate.  Using a sterile surgical marker, an appropriate hatchet flap was drawn incorporating the defect and placing the expected incisions within the relaxed skin tension lines where possible.    The area thus outlined was incised deep to adipose tissue with a #15 scalpel blade.  The skin margins were undermined to an appropriate distance in all directions utilizing iris scissors.

## 2023-02-07 ENCOUNTER — APPOINTMENT (OUTPATIENT)
Dept: ORTHOPEDIC SURGERY | Facility: CLINIC | Age: 83
End: 2023-02-07
Payer: MEDICARE

## 2023-02-07 PROCEDURE — 99213 OFFICE O/P EST LOW 20 MIN: CPT

## 2023-02-07 NOTE — HISTORY OF PRESENT ILLNESS
[de-identified] : The patient is an 82-year-old female accompanied by her daughter here for a subsequent re-evaluation about knee osteoarthritis.  She is status post Synvisc series injection for each knee, last injection for each knee was 08/29/2022.  She did well from the injections, her pain has since returned.  Left knee troubles her more than the right knee.

## 2023-02-07 NOTE — DISCUSSION/SUMMARY
[de-identified] :   I recommend repeat viscous injections.  Please send authorization for Synvisc series injections for each knee.  Once the medication arrives, we will call the patient to schedule the appointments to administer the injections.\par \par Supervising physician:  Dr. Webber

## 2023-02-07 NOTE — PHYSICAL EXAM
[Bilateral] : knee bilaterally [] : uses wheelchair [FreeTextEntry3] :  Valgus alignment noted bilaterally. [FreeTextEntry8] :   Tenderness to palpation over the medial and lateral joint lines of the right knee.  Mild tenderness to palpation over the medial joint line left knee. [FreeTextEntry9] :   Right knee flexion to 90°, -5 degrees extension.  Left knee flexion to 110°, full extension.

## 2023-03-06 ENCOUNTER — APPOINTMENT (OUTPATIENT)
Dept: ORTHOPEDIC SURGERY | Facility: CLINIC | Age: 83
End: 2023-03-06
Payer: MEDICARE

## 2023-03-06 PROCEDURE — 20611 DRAIN/INJ JOINT/BURSA W/US: CPT | Mod: 50

## 2023-03-06 PROCEDURE — 99213 OFFICE O/P EST LOW 20 MIN: CPT | Mod: 25

## 2023-03-06 NOTE — DISCUSSION/SUMMARY
[de-identified] : Today I recommended Synvisc injection for each knee.  The patient agreed.  Both knees were injected with 2 cc of Synvisc, procedure note generated.  I will see her back in 1 week for further evaluation.  This was the first injection of Synvisc for each knee.\par \par Supervising Physician: Dr. Nicole

## 2023-03-06 NOTE — PROCEDURE
[Large Joint Injection] : Large joint injection [Bilateral] : bilaterally of the [Knee] : knee [Synvisc (16mg)] : 16mg of Synvisc [#1] : series #1 [Risks, benefits, alternatives discussed / Verbal consent obtained] : the risks benefits, and alternatives have been discussed, and verbal consent was obtained [All ultrasound images have been permanently captured and stored accordingly in our picture archiving and communication system] : All ultrasound images have been permanently captured and stored accordingly in our picture archiving and communication system [Visualization of the needle and placement of injection was performed without complication] : visualization of the needle and placement of injection was performed without complication

## 2023-03-06 NOTE — HISTORY OF PRESENT ILLNESS
[de-identified] : The patient is an 82-year-old female here for a subsequent reevaluation of bilateral knee osteoarthritis.

## 2023-03-06 NOTE — PHYSICAL EXAM
[Bilateral] : knee bilaterally [NL (0)] : extension 0 degrees [] : uses wheelchair [TWNoteComboBox7] : flexion 90 degrees

## 2023-03-13 ENCOUNTER — APPOINTMENT (OUTPATIENT)
Dept: ORTHOPEDIC SURGERY | Facility: CLINIC | Age: 83
End: 2023-03-13
Payer: MEDICARE

## 2023-03-13 PROCEDURE — 20611 DRAIN/INJ JOINT/BURSA W/US: CPT | Mod: 50

## 2023-03-13 NOTE — HISTORY OF PRESENT ILLNESS
[de-identified] : The patient is an 82-year-old female here for a subsequent reevaluation of bilateral knee osteoarthritis.

## 2023-03-13 NOTE — PROCEDURE
[Large Joint Injection] : Large joint injection [Bilateral] : bilaterally of the [Knee] : knee [Synvisc (16mg)] : 16mg of Synvisc [#2] : series #2 [Risks, benefits, alternatives discussed / Verbal consent obtained] : the risks benefits, and alternatives have been discussed, and verbal consent was obtained [All ultrasound images have been permanently captured and stored accordingly in our picture archiving and communication system] : All ultrasound images have been permanently captured and stored accordingly in our picture archiving and communication system [Visualization of the needle and placement of injection was performed without complication] : visualization of the needle and placement of injection was performed without complication

## 2023-03-13 NOTE — DISCUSSION/SUMMARY
[de-identified] : Today I recommended Synvisc injection for each knee.  The patient agreed.  Both knees were injected with 2 cc of Synvisc, procedure note generated.  I will see her back in 1 week for further evaluation.  This was the second injection of Synvisc for each knee.\par \par Supervising Physician: Dr. Nicole

## 2023-03-20 ENCOUNTER — APPOINTMENT (OUTPATIENT)
Dept: ORTHOPEDIC SURGERY | Facility: CLINIC | Age: 83
End: 2023-03-20
Payer: MEDICARE

## 2023-03-20 DIAGNOSIS — M17.0 BILATERAL PRIMARY OSTEOARTHRITIS OF KNEE: ICD-10-CM

## 2023-03-20 PROCEDURE — 20611 DRAIN/INJ JOINT/BURSA W/US: CPT | Mod: 50

## 2023-03-20 NOTE — HISTORY OF PRESENT ILLNESS
[de-identified] : The patient is an 82-year-old female here for a subsequent reevaluation of bilateral knee osteoarthritis.  She is accompanied by her daughter today.  She reports she was unable to walk after the actions were administered at her last office visit.

## 2023-03-20 NOTE — PROCEDURE
[Large Joint Injection] : Large joint injection [Bilateral] : bilaterally of the [Knee] : knee [Synvisc (16mg)] : 16mg of Synvisc [#3] : series #3 [Risks, benefits, alternatives discussed / Verbal consent obtained] : the risks benefits, and alternatives have been discussed, and verbal consent was obtained [All ultrasound images have been permanently captured and stored accordingly in our picture archiving and communication system] : All ultrasound images have been permanently captured and stored accordingly in our picture archiving and communication system [Visualization of the needle and placement of injection was performed without complication] : visualization of the needle and placement of injection was performed without complication

## 2023-03-20 NOTE — DISCUSSION/SUMMARY
[de-identified] : Today I recommend a Synvisc injection for each knee.  The patient agreed.  Both knees were injected with 2 cc of Synvisc, procedure note generated.  She felt immediate relief today after the injections were administered.  She will start formal physical therapy to include gait training.  I will see her back in 6 months for further evaluation.  This was the third injection of Synvisc for each knee.\par \par Supervising Physician: Dr. Nicole called for bed, patient will be transfered to TELE room 316

## 2023-09-18 ENCOUNTER — APPOINTMENT (OUTPATIENT)
Dept: ORTHOPEDIC SURGERY | Facility: CLINIC | Age: 83
End: 2023-09-18

## 2024-01-18 ENCOUNTER — APPOINTMENT (OUTPATIENT)
Dept: HEMATOLOGY ONCOLOGY | Facility: CLINIC | Age: 84
End: 2024-01-18

## 2024-07-10 ENCOUNTER — EMERGENCY (EMERGENCY)
Facility: HOSPITAL | Age: 84
LOS: 0 days | Discharge: HOME | End: 2024-07-11
Attending: EMERGENCY MEDICINE
Payer: MEDICARE

## 2024-07-10 VITALS
DIASTOLIC BLOOD PRESSURE: 54 MMHG | OXYGEN SATURATION: 98 % | SYSTOLIC BLOOD PRESSURE: 150 MMHG | WEIGHT: 138.89 LBS | HEART RATE: 82 BPM | RESPIRATION RATE: 18 BRPM | TEMPERATURE: 98 F

## 2024-07-10 VITALS
TEMPERATURE: 99 F | RESPIRATION RATE: 20 BRPM | OXYGEN SATURATION: 98 % | SYSTOLIC BLOOD PRESSURE: 160 MMHG | HEART RATE: 80 BPM | DIASTOLIC BLOOD PRESSURE: 67 MMHG

## 2024-07-10 DIAGNOSIS — I44.7 LEFT BUNDLE-BRANCH BLOCK, UNSPECIFIED: ICD-10-CM

## 2024-07-10 DIAGNOSIS — D64.9 ANEMIA, UNSPECIFIED: ICD-10-CM

## 2024-07-10 LAB
ABO RH CONFIRMATION: SIGNIFICANT CHANGE UP
ALBUMIN SERPL ELPH-MCNC: 3.8 G/DL — SIGNIFICANT CHANGE UP (ref 3.5–5.2)
ALP SERPL-CCNC: 144 U/L — HIGH (ref 30–115)
ALT FLD-CCNC: 7 U/L — SIGNIFICANT CHANGE UP (ref 0–41)
ANION GAP SERPL CALC-SCNC: 13 MMOL/L — SIGNIFICANT CHANGE UP (ref 7–14)
ANISOCYTOSIS BLD QL: SLIGHT — SIGNIFICANT CHANGE UP
AST SERPL-CCNC: 14 U/L — SIGNIFICANT CHANGE UP (ref 0–41)
BASOPHILS # BLD AUTO: 0.05 K/UL — SIGNIFICANT CHANGE UP (ref 0–0.2)
BASOPHILS NFR BLD AUTO: 0.7 % — SIGNIFICANT CHANGE UP (ref 0–1)
BILIRUB SERPL-MCNC: 0.3 MG/DL — SIGNIFICANT CHANGE UP (ref 0.2–1.2)
BLD GP AB SCN SERPL QL: SIGNIFICANT CHANGE UP
BUN SERPL-MCNC: 55 MG/DL — HIGH (ref 10–20)
CALCIUM SERPL-MCNC: 8.6 MG/DL — SIGNIFICANT CHANGE UP (ref 8.4–10.5)
CHLORIDE SERPL-SCNC: 106 MMOL/L — SIGNIFICANT CHANGE UP (ref 98–110)
CO2 SERPL-SCNC: 18 MMOL/L — SIGNIFICANT CHANGE UP (ref 17–32)
CREAT SERPL-MCNC: 1.5 MG/DL — SIGNIFICANT CHANGE UP (ref 0.7–1.5)
EGFR: 35 ML/MIN/1.73M2 — LOW
EOSINOPHIL # BLD AUTO: 0.23 K/UL — SIGNIFICANT CHANGE UP (ref 0–0.7)
EOSINOPHIL NFR BLD AUTO: 3.1 % — SIGNIFICANT CHANGE UP (ref 0–8)
GLUCOSE SERPL-MCNC: 89 MG/DL — SIGNIFICANT CHANGE UP (ref 70–99)
HCT VFR BLD CALC: 21.2 % — LOW (ref 37–47)
HGB BLD-MCNC: 6.5 G/DL — CRITICAL LOW (ref 12–16)
HYPOCHROMIA BLD QL: SIGNIFICANT CHANGE UP
IMM GRANULOCYTES NFR BLD AUTO: 0.5 % — HIGH (ref 0.1–0.3)
LYMPHOCYTES # BLD AUTO: 1.12 K/UL — LOW (ref 1.2–3.4)
LYMPHOCYTES # BLD AUTO: 15.3 % — LOW (ref 20.5–51.1)
MANUAL SMEAR VERIFICATION: SIGNIFICANT CHANGE UP
MCHC RBC-ENTMCNC: 27.8 PG — SIGNIFICANT CHANGE UP (ref 27–31)
MCHC RBC-ENTMCNC: 30.7 G/DL — LOW (ref 32–37)
MCV RBC AUTO: 90.6 FL — SIGNIFICANT CHANGE UP (ref 81–99)
MONOCYTES # BLD AUTO: 0.67 K/UL — HIGH (ref 0.1–0.6)
MONOCYTES NFR BLD AUTO: 9.2 % — SIGNIFICANT CHANGE UP (ref 1.7–9.3)
NEUTROPHILS # BLD AUTO: 5.2 K/UL — SIGNIFICANT CHANGE UP (ref 1.4–6.5)
NEUTROPHILS NFR BLD AUTO: 71.2 % — SIGNIFICANT CHANGE UP (ref 42.2–75.2)
NRBC # BLD: 0 /100 WBCS — SIGNIFICANT CHANGE UP (ref 0–0)
PLAT MORPH BLD: NORMAL — SIGNIFICANT CHANGE UP
PLATELET # BLD AUTO: 451 K/UL — HIGH (ref 130–400)
PMV BLD: 9.5 FL — SIGNIFICANT CHANGE UP (ref 7.4–10.4)
POLYCHROMASIA BLD QL SMEAR: SLIGHT — SIGNIFICANT CHANGE UP
POTASSIUM SERPL-MCNC: 5.6 MMOL/L — HIGH (ref 3.5–5)
POTASSIUM SERPL-SCNC: 5.6 MMOL/L — HIGH (ref 3.5–5)
PROT SERPL-MCNC: 6.7 G/DL — SIGNIFICANT CHANGE UP (ref 6–8)
RBC # BLD: 2.34 M/UL — LOW (ref 4.2–5.4)
RBC # FLD: 17 % — HIGH (ref 11.5–14.5)
RBC BLD AUTO: ABNORMAL
SCHISTOCYTES BLD QL AUTO: SLIGHT — SIGNIFICANT CHANGE UP
SODIUM SERPL-SCNC: 137 MMOL/L — SIGNIFICANT CHANGE UP (ref 135–146)
WBC # BLD: 7.31 K/UL — SIGNIFICANT CHANGE UP (ref 4.8–10.8)
WBC # FLD AUTO: 7.31 K/UL — SIGNIFICANT CHANGE UP (ref 4.8–10.8)

## 2024-07-10 PROCEDURE — 93010 ELECTROCARDIOGRAM REPORT: CPT

## 2024-07-10 PROCEDURE — P9016: CPT | Mod: XU

## 2024-07-10 PROCEDURE — 86923 COMPATIBILITY TEST ELECTRIC: CPT

## 2024-07-10 PROCEDURE — 80053 COMPREHEN METABOLIC PANEL: CPT

## 2024-07-10 PROCEDURE — 86900 BLOOD TYPING SEROLOGIC ABO: CPT

## 2024-07-10 PROCEDURE — 36415 COLL VENOUS BLD VENIPUNCTURE: CPT

## 2024-07-10 PROCEDURE — 36430 TRANSFUSION BLD/BLD COMPNT: CPT

## 2024-07-10 PROCEDURE — P9040: CPT

## 2024-07-10 PROCEDURE — 85025 COMPLETE CBC W/AUTO DIFF WBC: CPT

## 2024-07-10 PROCEDURE — 99285 EMERGENCY DEPT VISIT HI MDM: CPT | Mod: FS

## 2024-07-10 PROCEDURE — 99285 EMERGENCY DEPT VISIT HI MDM: CPT | Mod: 25

## 2024-07-10 PROCEDURE — 86901 BLOOD TYPING SEROLOGIC RH(D): CPT

## 2024-07-10 PROCEDURE — 93005 ELECTROCARDIOGRAM TRACING: CPT

## 2024-07-10 PROCEDURE — 86850 RBC ANTIBODY SCREEN: CPT

## 2024-07-10 RX ORDER — SODIUM ZIRCONIUM CYCLOSILICATE 10 G/10G
10 POWDER, FOR SUSPENSION ORAL ONCE
Refills: 0 | Status: COMPLETED | OUTPATIENT
Start: 2024-07-10 | End: 2024-07-10

## 2024-07-10 RX ADMIN — SODIUM ZIRCONIUM CYCLOSILICATE 10 GRAM(S): 10 POWDER, FOR SUSPENSION ORAL at 19:57

## 2024-10-17 ENCOUNTER — EMERGENCY (EMERGENCY)
Facility: HOSPITAL | Age: 84
LOS: 0 days | Discharge: HOME | End: 2024-10-17
Attending: STUDENT IN AN ORGANIZED HEALTH CARE EDUCATION/TRAINING PROGRAM
Payer: MEDICARE

## 2024-10-17 VITALS
TEMPERATURE: 98 F | OXYGEN SATURATION: 100 % | RESPIRATION RATE: 18 BRPM | HEART RATE: 77 BPM | SYSTOLIC BLOOD PRESSURE: 153 MMHG | DIASTOLIC BLOOD PRESSURE: 73 MMHG | WEIGHT: 134.7 LBS

## 2024-10-17 VITALS
DIASTOLIC BLOOD PRESSURE: 70 MMHG | HEART RATE: 72 BPM | OXYGEN SATURATION: 99 % | TEMPERATURE: 98 F | RESPIRATION RATE: 17 BRPM | SYSTOLIC BLOOD PRESSURE: 148 MMHG

## 2024-10-17 DIAGNOSIS — M19.90 UNSPECIFIED OSTEOARTHRITIS, UNSPECIFIED SITE: ICD-10-CM

## 2024-10-17 DIAGNOSIS — I73.9 PERIPHERAL VASCULAR DISEASE, UNSPECIFIED: ICD-10-CM

## 2024-10-17 DIAGNOSIS — R79.89 OTHER SPECIFIED ABNORMAL FINDINGS OF BLOOD CHEMISTRY: ICD-10-CM

## 2024-10-17 DIAGNOSIS — E78.5 HYPERLIPIDEMIA, UNSPECIFIED: ICD-10-CM

## 2024-10-17 DIAGNOSIS — I10 ESSENTIAL (PRIMARY) HYPERTENSION: ICD-10-CM

## 2024-10-17 DIAGNOSIS — D64.9 ANEMIA, UNSPECIFIED: ICD-10-CM

## 2024-10-17 LAB
ABO RH CONFIRMATION: SIGNIFICANT CHANGE UP
ALBUMIN SERPL ELPH-MCNC: 3.9 G/DL — SIGNIFICANT CHANGE UP (ref 3.5–5.2)
ALP SERPL-CCNC: 144 U/L — HIGH (ref 30–115)
ALT FLD-CCNC: 9 U/L — SIGNIFICANT CHANGE UP (ref 0–41)
ANION GAP SERPL CALC-SCNC: 11 MMOL/L — SIGNIFICANT CHANGE UP (ref 7–14)
AST SERPL-CCNC: 26 U/L — SIGNIFICANT CHANGE UP (ref 0–41)
BASOPHILS # BLD AUTO: 0.03 K/UL — SIGNIFICANT CHANGE UP (ref 0–0.2)
BASOPHILS NFR BLD AUTO: 0.5 % — SIGNIFICANT CHANGE UP (ref 0–1)
BILIRUB SERPL-MCNC: 0.3 MG/DL — SIGNIFICANT CHANGE UP (ref 0.2–1.2)
BUN SERPL-MCNC: 57 MG/DL — HIGH (ref 10–20)
CALCIUM SERPL-MCNC: 8.7 MG/DL — SIGNIFICANT CHANGE UP (ref 8.4–10.4)
CHLORIDE SERPL-SCNC: 114 MMOL/L — HIGH (ref 98–110)
CO2 SERPL-SCNC: 14 MMOL/L — LOW (ref 17–32)
CREAT SERPL-MCNC: 1.7 MG/DL — HIGH (ref 0.7–1.5)
EGFR: 30 ML/MIN/1.73M2 — LOW
EOSINOPHIL # BLD AUTO: 0.24 K/UL — SIGNIFICANT CHANGE UP (ref 0–0.7)
EOSINOPHIL NFR BLD AUTO: 4.1 % — SIGNIFICANT CHANGE UP (ref 0–8)
GLUCOSE SERPL-MCNC: 91 MG/DL — SIGNIFICANT CHANGE UP (ref 70–99)
HCT VFR BLD CALC: 24.5 % — LOW (ref 37–47)
HGB BLD-MCNC: 7.3 G/DL — LOW (ref 12–16)
IMM GRANULOCYTES NFR BLD AUTO: 0.3 % — SIGNIFICANT CHANGE UP (ref 0.1–0.3)
LYMPHOCYTES # BLD AUTO: 1.03 K/UL — LOW (ref 1.2–3.4)
LYMPHOCYTES # BLD AUTO: 17.8 % — LOW (ref 20.5–51.1)
MCHC RBC-ENTMCNC: 27.1 PG — SIGNIFICANT CHANGE UP (ref 27–31)
MCHC RBC-ENTMCNC: 29.8 G/DL — LOW (ref 32–37)
MCV RBC AUTO: 91.1 FL — SIGNIFICANT CHANGE UP (ref 81–99)
MONOCYTES # BLD AUTO: 0.53 K/UL — SIGNIFICANT CHANGE UP (ref 0.1–0.6)
MONOCYTES NFR BLD AUTO: 9.1 % — SIGNIFICANT CHANGE UP (ref 1.7–9.3)
NEUTROPHILS # BLD AUTO: 3.95 K/UL — SIGNIFICANT CHANGE UP (ref 1.4–6.5)
NEUTROPHILS NFR BLD AUTO: 68.2 % — SIGNIFICANT CHANGE UP (ref 42.2–75.2)
NRBC # BLD: 0 /100 WBCS — SIGNIFICANT CHANGE UP (ref 0–0)
PLATELET # BLD AUTO: 346 K/UL — SIGNIFICANT CHANGE UP (ref 130–400)
PMV BLD: 10.4 FL — SIGNIFICANT CHANGE UP (ref 7.4–10.4)
POTASSIUM SERPL-MCNC: 6 MMOL/L — CRITICAL HIGH (ref 3.5–5)
POTASSIUM SERPL-SCNC: 6 MMOL/L — CRITICAL HIGH (ref 3.5–5)
PROT SERPL-MCNC: 6.8 G/DL — SIGNIFICANT CHANGE UP (ref 6–8)
RBC # BLD: 2.69 M/UL — LOW (ref 4.2–5.4)
RBC # FLD: 19.7 % — HIGH (ref 11.5–14.5)
SODIUM SERPL-SCNC: 139 MMOL/L — SIGNIFICANT CHANGE UP (ref 135–146)
WBC # BLD: 5.8 K/UL — SIGNIFICANT CHANGE UP (ref 4.8–10.8)
WBC # FLD AUTO: 5.8 K/UL — SIGNIFICANT CHANGE UP (ref 4.8–10.8)

## 2024-10-17 PROCEDURE — 99283 EMERGENCY DEPT VISIT LOW MDM: CPT

## 2024-10-17 PROCEDURE — 85025 COMPLETE CBC W/AUTO DIFF WBC: CPT

## 2024-10-17 PROCEDURE — 36415 COLL VENOUS BLD VENIPUNCTURE: CPT

## 2024-10-17 PROCEDURE — 86901 BLOOD TYPING SEROLOGIC RH(D): CPT

## 2024-10-17 PROCEDURE — 99284 EMERGENCY DEPT VISIT MOD MDM: CPT

## 2024-10-17 PROCEDURE — 86900 BLOOD TYPING SEROLOGIC ABO: CPT

## 2024-10-17 PROCEDURE — 80053 COMPREHEN METABOLIC PANEL: CPT

## 2024-10-17 PROCEDURE — 86850 RBC ANTIBODY SCREEN: CPT

## 2024-10-17 NOTE — ED PROVIDER NOTE - PATIENT PORTAL LINK FT
You can access the FollowMyHealth Patient Portal offered by  by registering at the following website: [email or web address]. By joining [facility]’s FollowMyHealth portal, you will also be able to view your health information using other applications (apps) compatible with our system.

## 2024-10-17 NOTE — ED PROVIDER NOTE - OBJECTIVE STATEMENT
84-year-old female PMH HTN, HLD, anemia, PVD, arthritis presents to the ED for evaluation of low hemoglobin patient states she had blood work done 2 days ago, hemoglobin 6.5.  Patient denies dizziness, lightheadedness, syncope/near syncope, chest pain, palpitations, shortness of breath, or any abnormal bleeding.

## 2024-10-17 NOTE — ED PROVIDER NOTE - PHYSICAL EXAMINATION
VITAL SIGNS: I have reviewed nursing notes and confirm.  CONSTITUTIONAL: well-appearing, NAD  SKIN: Warm dry  HEAD: NCAT  EYES: EOMI, PERRL  ENT: Moist mucous membranes, normal pharynx with no erythema or exudates  NECK: Supple.  CARD: RRR, no murmurs, rubs or gallops  RESP: clear to ausculation b/l.    ABD: soft, non-tender, non-distended.  EXT: Full ROM, no pedal edema  NEURO: Grossly intact  PSYCH: Cooperative, appropriate.

## 2024-10-17 NOTE — ED ADULT NURSE NOTE - NSFALLHARMRISKINTERV_ED_ALL_ED

## 2024-10-17 NOTE — ED ADULT NURSE NOTE - PRO INTERPRETER NEED 2
Pt seen due to ICU adm.  Pt appears to be tolerating clear fluid diet in fair amounts at present. English

## 2024-10-17 NOTE — ED PROVIDER NOTE - CLINICAL SUMMARY MEDICAL DECISION MAKING FREE TEXT BOX
84-year-old presented today for evaluation of low hemoglobin.  Patient is noted to have anemia however hemoglobin is 7.3.  Patient has no other symptoms at this time.  Will discharge to close follow-up outpatient.  Return precautions explained to patient

## 2024-10-24 ENCOUNTER — EMERGENCY (EMERGENCY)
Facility: HOSPITAL | Age: 84
LOS: 0 days | Discharge: HOME | End: 2024-10-24
Attending: EMERGENCY MEDICINE
Payer: MEDICARE

## 2024-10-24 VITALS
HEART RATE: 72 BPM | SYSTOLIC BLOOD PRESSURE: 156 MMHG | RESPIRATION RATE: 20 BRPM | DIASTOLIC BLOOD PRESSURE: 58 MMHG | OXYGEN SATURATION: 99 %

## 2024-10-24 VITALS
DIASTOLIC BLOOD PRESSURE: 47 MMHG | SYSTOLIC BLOOD PRESSURE: 160 MMHG | RESPIRATION RATE: 18 BRPM | OXYGEN SATURATION: 99 % | HEART RATE: 79 BPM | TEMPERATURE: 98 F

## 2024-10-24 DIAGNOSIS — D64.9 ANEMIA, UNSPECIFIED: ICD-10-CM

## 2024-10-24 LAB
ALBUMIN SERPL ELPH-MCNC: 4.2 G/DL — SIGNIFICANT CHANGE UP (ref 3.5–5.2)
ALP SERPL-CCNC: 151 U/L — HIGH (ref 30–115)
ALT FLD-CCNC: 10 U/L — SIGNIFICANT CHANGE UP (ref 0–41)
ANION GAP SERPL CALC-SCNC: 13 MMOL/L — SIGNIFICANT CHANGE UP (ref 7–14)
APTT BLD: 27.9 SEC — SIGNIFICANT CHANGE UP (ref 27–39.2)
AST SERPL-CCNC: 18 U/L — SIGNIFICANT CHANGE UP (ref 0–41)
BASOPHILS # BLD AUTO: 0.01 K/UL — SIGNIFICANT CHANGE UP (ref 0–0.2)
BASOPHILS NFR BLD AUTO: 0.1 % — SIGNIFICANT CHANGE UP (ref 0–1)
BILIRUB SERPL-MCNC: 0.3 MG/DL — SIGNIFICANT CHANGE UP (ref 0.2–1.2)
BLD GP AB SCN SERPL QL: SIGNIFICANT CHANGE UP
BUN SERPL-MCNC: 48 MG/DL — HIGH (ref 10–20)
CALCIUM SERPL-MCNC: 8.9 MG/DL — SIGNIFICANT CHANGE UP (ref 8.4–10.5)
CHLORIDE SERPL-SCNC: 108 MMOL/L — SIGNIFICANT CHANGE UP (ref 98–110)
CO2 SERPL-SCNC: 16 MMOL/L — LOW (ref 17–32)
CREAT SERPL-MCNC: 1.5 MG/DL — SIGNIFICANT CHANGE UP (ref 0.7–1.5)
EGFR: 34 ML/MIN/1.73M2 — LOW
EOSINOPHIL # BLD AUTO: 0.11 K/UL — SIGNIFICANT CHANGE UP (ref 0–0.7)
EOSINOPHIL NFR BLD AUTO: 1.4 % — SIGNIFICANT CHANGE UP (ref 0–8)
GLUCOSE SERPL-MCNC: 80 MG/DL — SIGNIFICANT CHANGE UP (ref 70–99)
HCT VFR BLD CALC: 23.8 % — LOW (ref 37–47)
HGB BLD-MCNC: 7.3 G/DL — LOW (ref 12–16)
IMM GRANULOCYTES NFR BLD AUTO: 0.4 % — HIGH (ref 0.1–0.3)
INR BLD: 1.03 RATIO — SIGNIFICANT CHANGE UP (ref 0.65–1.3)
LYMPHOCYTES # BLD AUTO: 0.83 K/UL — LOW (ref 1.2–3.4)
LYMPHOCYTES # BLD AUTO: 10.3 % — LOW (ref 20.5–51.1)
MCHC RBC-ENTMCNC: 27.2 PG — SIGNIFICANT CHANGE UP (ref 27–31)
MCHC RBC-ENTMCNC: 30.7 G/DL — LOW (ref 32–37)
MCV RBC AUTO: 88.8 FL — SIGNIFICANT CHANGE UP (ref 81–99)
MONOCYTES # BLD AUTO: 0.68 K/UL — HIGH (ref 0.1–0.6)
MONOCYTES NFR BLD AUTO: 8.4 % — SIGNIFICANT CHANGE UP (ref 1.7–9.3)
NEUTROPHILS # BLD AUTO: 6.39 K/UL — SIGNIFICANT CHANGE UP (ref 1.4–6.5)
NEUTROPHILS NFR BLD AUTO: 79.4 % — HIGH (ref 42.2–75.2)
NRBC # BLD: 0 /100 WBCS — SIGNIFICANT CHANGE UP (ref 0–0)
PLATELET # BLD AUTO: 350 K/UL — SIGNIFICANT CHANGE UP (ref 130–400)
PMV BLD: 9.9 FL — SIGNIFICANT CHANGE UP (ref 7.4–10.4)
POTASSIUM SERPL-MCNC: 4.5 MMOL/L — SIGNIFICANT CHANGE UP (ref 3.5–5)
POTASSIUM SERPL-SCNC: 4.5 MMOL/L — SIGNIFICANT CHANGE UP (ref 3.5–5)
PROT SERPL-MCNC: 7.2 G/DL — SIGNIFICANT CHANGE UP (ref 6–8)
PROTHROM AB SERPL-ACNC: 11.7 SEC — SIGNIFICANT CHANGE UP (ref 9.95–12.87)
RBC # BLD: 2.68 M/UL — LOW (ref 4.2–5.4)
RBC # FLD: 19.2 % — HIGH (ref 11.5–14.5)
SODIUM SERPL-SCNC: 137 MMOL/L — SIGNIFICANT CHANGE UP (ref 135–146)
WBC # BLD: 8.05 K/UL — SIGNIFICANT CHANGE UP (ref 4.8–10.8)
WBC # FLD AUTO: 8.05 K/UL — SIGNIFICANT CHANGE UP (ref 4.8–10.8)

## 2024-10-24 PROCEDURE — 85730 THROMBOPLASTIN TIME PARTIAL: CPT

## 2024-10-24 PROCEDURE — 85025 COMPLETE CBC W/AUTO DIFF WBC: CPT

## 2024-10-24 PROCEDURE — 80053 COMPREHEN METABOLIC PANEL: CPT

## 2024-10-24 PROCEDURE — 86850 RBC ANTIBODY SCREEN: CPT

## 2024-10-24 PROCEDURE — 85610 PROTHROMBIN TIME: CPT

## 2024-10-24 PROCEDURE — 99284 EMERGENCY DEPT VISIT MOD MDM: CPT | Mod: FS

## 2024-10-24 PROCEDURE — 99283 EMERGENCY DEPT VISIT LOW MDM: CPT

## 2024-10-24 PROCEDURE — 36415 COLL VENOUS BLD VENIPUNCTURE: CPT

## 2024-10-24 PROCEDURE — 86901 BLOOD TYPING SEROLOGIC RH(D): CPT

## 2024-10-24 PROCEDURE — 86900 BLOOD TYPING SEROLOGIC ABO: CPT

## 2024-10-24 NOTE — ED PROVIDER NOTE - PATIENT PORTAL LINK FT
You can access the FollowMyHealth Patient Portal offered by [facility] by registering at the following website: [email or web address]. By joining [employer]’s FollowMyHealth portal, you will also be able to view your health information using other applications (apps) compatible with our system.

## 2024-10-24 NOTE — ED PROVIDER NOTE - CLINICAL SUMMARY MEDICAL DECISION MAKING FREE TEXT BOX
The patient presented with reported low hemoglobin outpatient to 6.4.  Patient otherwise asymptomatic and states that she is at her baseline state of health.  Afebrile, hemodynamically stable, overall well-appearing.  Patient with history of anemia in the past and usually runs in the 7-8 range.  Labs obtained remarkable for hemoglobin of 7.3 and no other significant abnormalities.  Patient remained asymptomatic during ED course.  Given hemoglobin above 7 and patient asymptomatic, will discharge back to nursing home for further outpatient follow-up.  Patient agreeable with plan. Agrees to return to ED for any new or worsening symptoms.

## 2024-10-24 NOTE — ED PROVIDER NOTE - OBJECTIVE STATEMENT
patient with chronic anemia, sent to ED from NH for low HB,  6.4 at NH,   no chest pain, no SOB, patient states she feels fine,

## 2024-10-24 NOTE — ED ADULT NURSE NOTE - NSFALLRISKINTERV_ED_ALL_ED

## 2024-11-08 ENCOUNTER — INPATIENT (INPATIENT)
Facility: HOSPITAL | Age: 84
LOS: 4 days | Discharge: NURSING, REHAB OR LONG-TERM CARE | DRG: 280 | End: 2024-11-13
Attending: STUDENT IN AN ORGANIZED HEALTH CARE EDUCATION/TRAINING PROGRAM | Admitting: STUDENT IN AN ORGANIZED HEALTH CARE EDUCATION/TRAINING PROGRAM
Payer: MEDICARE

## 2024-11-08 VITALS
DIASTOLIC BLOOD PRESSURE: 61 MMHG | SYSTOLIC BLOOD PRESSURE: 107 MMHG | OXYGEN SATURATION: 100 % | HEIGHT: 66 IN | RESPIRATION RATE: 16 BRPM | HEART RATE: 57 BPM | WEIGHT: 134.92 LBS | TEMPERATURE: 98 F

## 2024-11-08 DIAGNOSIS — D64.9 ANEMIA, UNSPECIFIED: ICD-10-CM

## 2024-11-08 LAB
ALBUMIN SERPL ELPH-MCNC: 3.3 G/DL — LOW (ref 3.5–5.2)
ALP SERPL-CCNC: 146 U/L — HIGH (ref 30–115)
ALT FLD-CCNC: 25 U/L — SIGNIFICANT CHANGE UP (ref 0–41)
ANION GAP SERPL CALC-SCNC: 17 MMOL/L — HIGH (ref 7–14)
ANISOCYTOSIS BLD QL: SIGNIFICANT CHANGE UP
APPEARANCE UR: CLEAR — SIGNIFICANT CHANGE UP
AST SERPL-CCNC: 43 U/L — HIGH (ref 0–41)
BASOPHILS # BLD AUTO: 0.03 K/UL — SIGNIFICANT CHANGE UP (ref 0–0.2)
BASOPHILS NFR BLD AUTO: 0.2 % — SIGNIFICANT CHANGE UP (ref 0–1)
BILIRUB SERPL-MCNC: <0.2 MG/DL — SIGNIFICANT CHANGE UP (ref 0.2–1.2)
BILIRUB UR-MCNC: NEGATIVE — SIGNIFICANT CHANGE UP
BLD GP AB SCN SERPL QL: SIGNIFICANT CHANGE UP
BUN SERPL-MCNC: 58 MG/DL — HIGH (ref 10–20)
BURR CELLS BLD QL SMEAR: PRESENT — SIGNIFICANT CHANGE UP
CALCIUM SERPL-MCNC: 8.1 MG/DL — LOW (ref 8.4–10.5)
CHLORIDE SERPL-SCNC: 101 MMOL/L — SIGNIFICANT CHANGE UP (ref 98–110)
CO2 SERPL-SCNC: 14 MMOL/L — LOW (ref 17–32)
COLOR SPEC: YELLOW — SIGNIFICANT CHANGE UP
CREAT SERPL-MCNC: 2.1 MG/DL — HIGH (ref 0.7–1.5)
DIFF PNL FLD: NEGATIVE — SIGNIFICANT CHANGE UP
EGFR: 23 ML/MIN/1.73M2 — LOW
ELLIPTOCYTES BLD QL SMEAR: SLIGHT — SIGNIFICANT CHANGE UP
EOSINOPHIL # BLD AUTO: 0.02 K/UL — SIGNIFICANT CHANGE UP (ref 0–0.7)
EOSINOPHIL NFR BLD AUTO: 0.1 % — SIGNIFICANT CHANGE UP (ref 0–8)
GLUCOSE SERPL-MCNC: 124 MG/DL — HIGH (ref 70–99)
GLUCOSE UR QL: NEGATIVE MG/DL — SIGNIFICANT CHANGE UP
HCT VFR BLD CALC: 21 % — LOW (ref 37–47)
HCT VFR BLD CALC: 23.7 % — LOW (ref 37–47)
HGB BLD-MCNC: 6.9 G/DL — CRITICAL LOW (ref 12–16)
HGB BLD-MCNC: 7.7 G/DL — LOW (ref 12–16)
HYPOCHROMIA BLD QL: SLIGHT — SIGNIFICANT CHANGE UP
IMM GRANULOCYTES NFR BLD AUTO: 1.2 % — HIGH (ref 0.1–0.3)
KETONES UR-MCNC: NEGATIVE MG/DL — SIGNIFICANT CHANGE UP
LEUKOCYTE ESTERASE UR-ACNC: ABNORMAL
LYMPHOCYTES # BLD AUTO: 0.71 K/UL — LOW (ref 1.2–3.4)
LYMPHOCYTES # BLD AUTO: 3.8 % — LOW (ref 20.5–51.1)
MACROCYTES BLD QL: SLIGHT — SIGNIFICANT CHANGE UP
MCHC RBC-ENTMCNC: 26.4 PG — LOW (ref 27–31)
MCHC RBC-ENTMCNC: 26.8 PG — LOW (ref 27–31)
MCHC RBC-ENTMCNC: 32.5 G/DL — SIGNIFICANT CHANGE UP (ref 32–37)
MCHC RBC-ENTMCNC: 32.9 G/DL — SIGNIFICANT CHANGE UP (ref 32–37)
MCV RBC AUTO: 80.5 FL — LOW (ref 81–99)
MCV RBC AUTO: 82.6 FL — SIGNIFICANT CHANGE UP (ref 81–99)
MICROCYTES BLD QL: SLIGHT — SIGNIFICANT CHANGE UP
MONOCYTES # BLD AUTO: 1.28 K/UL — HIGH (ref 0.1–0.6)
MONOCYTES NFR BLD AUTO: 6.9 % — SIGNIFICANT CHANGE UP (ref 1.7–9.3)
NEUTROPHILS # BLD AUTO: 16.24 K/UL — HIGH (ref 1.4–6.5)
NEUTROPHILS NFR BLD AUTO: 87.8 % — HIGH (ref 42.2–75.2)
NITRITE UR-MCNC: NEGATIVE — SIGNIFICANT CHANGE UP
NRBC # BLD: 0 /100 WBCS — SIGNIFICANT CHANGE UP (ref 0–0)
NRBC # BLD: 0 /100 WBCS — SIGNIFICANT CHANGE UP (ref 0–0)
NT-PROBNP SERPL-SCNC: HIGH PG/ML (ref 0–300)
OB PNL STL: NEGATIVE — SIGNIFICANT CHANGE UP
OVALOCYTES BLD QL SMEAR: SLIGHT — SIGNIFICANT CHANGE UP
PH UR: 5.5 — SIGNIFICANT CHANGE UP (ref 5–8)
PLAT MORPH BLD: NORMAL — SIGNIFICANT CHANGE UP
PLATELET # BLD AUTO: 381 K/UL — SIGNIFICANT CHANGE UP (ref 130–400)
PLATELET # BLD AUTO: 434 K/UL — HIGH (ref 130–400)
PMV BLD: 10 FL — SIGNIFICANT CHANGE UP (ref 7.4–10.4)
PMV BLD: 9.5 FL — SIGNIFICANT CHANGE UP (ref 7.4–10.4)
POIKILOCYTOSIS BLD QL AUTO: SLIGHT — SIGNIFICANT CHANGE UP
POLYCHROMASIA BLD QL SMEAR: SLIGHT — SIGNIFICANT CHANGE UP
POTASSIUM SERPL-MCNC: 4.4 MMOL/L — SIGNIFICANT CHANGE UP (ref 3.5–5)
POTASSIUM SERPL-SCNC: 4.4 MMOL/L — SIGNIFICANT CHANGE UP (ref 3.5–5)
PROT SERPL-MCNC: 6.3 G/DL — SIGNIFICANT CHANGE UP (ref 6–8)
PROT UR-MCNC: 100 MG/DL
RBC # BLD: 2.61 M/UL — LOW (ref 4.2–5.4)
RBC # BLD: 2.87 M/UL — LOW (ref 4.2–5.4)
RBC # FLD: 16.9 % — HIGH (ref 11.5–14.5)
RBC # FLD: 17.4 % — HIGH (ref 11.5–14.5)
RBC BLD AUTO: ABNORMAL
SODIUM SERPL-SCNC: 132 MMOL/L — LOW (ref 135–146)
SP GR SPEC: 1.02 — SIGNIFICANT CHANGE UP (ref 1–1.03)
TARGETS BLD QL SMEAR: SLIGHT — SIGNIFICANT CHANGE UP
TROPONIN T, HIGH SENSITIVITY RESULT: 825 NG/L — CRITICAL HIGH (ref 6–13)
TROPONIN T, HIGH SENSITIVITY RESULT: 965 NG/L — CRITICAL HIGH (ref 6–13)
TROPONIN T, HIGH SENSITIVITY RESULT: 988 NG/L — CRITICAL HIGH (ref 6–13)
UROBILINOGEN FLD QL: 0.2 MG/DL — SIGNIFICANT CHANGE UP (ref 0.2–1)
WBC # BLD: 15.52 K/UL — HIGH (ref 4.8–10.8)
WBC # BLD: 18.51 K/UL — HIGH (ref 4.8–10.8)
WBC # FLD AUTO: 15.52 K/UL — HIGH (ref 4.8–10.8)
WBC # FLD AUTO: 18.51 K/UL — HIGH (ref 4.8–10.8)

## 2024-11-08 PROCEDURE — 99223 1ST HOSP IP/OBS HIGH 75: CPT | Mod: FS

## 2024-11-08 PROCEDURE — 71045 X-RAY EXAM CHEST 1 VIEW: CPT | Mod: 26

## 2024-11-08 PROCEDURE — 83735 ASSAY OF MAGNESIUM: CPT

## 2024-11-08 PROCEDURE — 85610 PROTHROMBIN TIME: CPT

## 2024-11-08 PROCEDURE — 36415 COLL VENOUS BLD VENIPUNCTURE: CPT

## 2024-11-08 PROCEDURE — 92610 EVALUATE SWALLOWING FUNCTION: CPT | Mod: GN

## 2024-11-08 PROCEDURE — 85027 COMPLETE CBC AUTOMATED: CPT

## 2024-11-08 PROCEDURE — 82728 ASSAY OF FERRITIN: CPT

## 2024-11-08 PROCEDURE — 87040 BLOOD CULTURE FOR BACTERIA: CPT

## 2024-11-08 PROCEDURE — 71250 CT THORAX DX C-: CPT | Mod: MC

## 2024-11-08 PROCEDURE — 93005 ELECTROCARDIOGRAM TRACING: CPT

## 2024-11-08 PROCEDURE — 0241U: CPT

## 2024-11-08 PROCEDURE — 84165 PROTEIN E-PHORESIS SERUM: CPT

## 2024-11-08 PROCEDURE — P9016: CPT

## 2024-11-08 PROCEDURE — 97162 PT EVAL MOD COMPLEX 30 MIN: CPT | Mod: GP

## 2024-11-08 PROCEDURE — 92526 ORAL FUNCTION THERAPY: CPT | Mod: GN

## 2024-11-08 PROCEDURE — 93306 TTE W/DOPPLER COMPLETE: CPT

## 2024-11-08 PROCEDURE — 85025 COMPLETE CBC W/AUTO DIFF WBC: CPT

## 2024-11-08 PROCEDURE — 87899 AGENT NOS ASSAY W/OPTIC: CPT

## 2024-11-08 PROCEDURE — 86334 IMMUNOFIX E-PHORESIS SERUM: CPT

## 2024-11-08 PROCEDURE — 80048 BASIC METABOLIC PNL TOTAL CA: CPT

## 2024-11-08 PROCEDURE — 84100 ASSAY OF PHOSPHORUS: CPT

## 2024-11-08 PROCEDURE — 99285 EMERGENCY DEPT VISIT HI MDM: CPT | Mod: FS

## 2024-11-08 PROCEDURE — 87086 URINE CULTURE/COLONY COUNT: CPT

## 2024-11-08 PROCEDURE — 36430 TRANSFUSION BLD/BLD COMPNT: CPT

## 2024-11-08 PROCEDURE — 80053 COMPREHEN METABOLIC PANEL: CPT

## 2024-11-08 PROCEDURE — 82746 ASSAY OF FOLIC ACID SERUM: CPT

## 2024-11-08 PROCEDURE — 83540 ASSAY OF IRON: CPT

## 2024-11-08 PROCEDURE — 82270 OCCULT BLOOD FECES: CPT

## 2024-11-08 PROCEDURE — 87449 NOS EACH ORGANISM AG IA: CPT

## 2024-11-08 PROCEDURE — 71045 X-RAY EXAM CHEST 1 VIEW: CPT

## 2024-11-08 PROCEDURE — 83550 IRON BINDING TEST: CPT

## 2024-11-08 PROCEDURE — 84484 ASSAY OF TROPONIN QUANT: CPT

## 2024-11-08 PROCEDURE — 93010 ELECTROCARDIOGRAM REPORT: CPT | Mod: 77

## 2024-11-08 PROCEDURE — 82607 VITAMIN B-12: CPT

## 2024-11-08 PROCEDURE — 81001 URINALYSIS AUTO W/SCOPE: CPT

## 2024-11-08 PROCEDURE — 84155 ASSAY OF PROTEIN SERUM: CPT

## 2024-11-08 PROCEDURE — 85730 THROMBOPLASTIN TIME PARTIAL: CPT

## 2024-11-08 PROCEDURE — 84443 ASSAY THYROID STIM HORMONE: CPT

## 2024-11-08 RX ORDER — DILTIAZEM HCL 5 MG/ML
30 VIAL (ML) INTRAVENOUS ONCE
Refills: 0 | Status: COMPLETED | OUTPATIENT
Start: 2024-11-08 | End: 2024-11-08

## 2024-11-08 RX ORDER — PANTOPRAZOLE SODIUM 40 MG/1
40 TABLET, DELAYED RELEASE ORAL
Refills: 0 | Status: DISCONTINUED | OUTPATIENT
Start: 2024-11-08 | End: 2024-11-13

## 2024-11-08 RX ORDER — CEFTRIAXONE SODIUM 10 G
1000 VIAL (EA) INJECTION EVERY 24 HOURS
Refills: 0 | Status: DISCONTINUED | OUTPATIENT
Start: 2024-11-08 | End: 2024-11-09

## 2024-11-08 RX ORDER — HEPARIN SODIUM 10000 [USP'U]/ML
5000 INJECTION INTRAVENOUS; SUBCUTANEOUS EVERY 12 HOURS
Refills: 0 | Status: DISCONTINUED | OUTPATIENT
Start: 2024-11-08 | End: 2024-11-09

## 2024-11-08 RX ORDER — FERROUS SULFATE 325(65) MG
325 TABLET ORAL DAILY
Refills: 0 | Status: DISCONTINUED | OUTPATIENT
Start: 2024-11-08 | End: 2024-11-13

## 2024-11-08 RX ORDER — AZITHROMYCIN DIHYDRATE 200 MG/5ML
500 POWDER, FOR SUSPENSION ORAL DAILY
Refills: 0 | Status: DISCONTINUED | OUTPATIENT
Start: 2024-11-08 | End: 2024-11-09

## 2024-11-08 RX ORDER — EZETIMIBE 10 MG/1
10 TABLET ORAL DAILY
Refills: 0 | Status: DISCONTINUED | OUTPATIENT
Start: 2024-11-08 | End: 2024-11-13

## 2024-11-08 RX ORDER — FUROSEMIDE 40 MG
40 TABLET ORAL ONCE
Refills: 0 | Status: COMPLETED | OUTPATIENT
Start: 2024-11-08 | End: 2024-11-08

## 2024-11-08 RX ORDER — ASPIRIN/MAG CARB/ALUMINUM AMIN 325 MG
81 TABLET ORAL DAILY
Refills: 0 | Status: DISCONTINUED | OUTPATIENT
Start: 2024-11-08 | End: 2024-11-09

## 2024-11-08 RX ORDER — METOPROLOL TARTRATE 50 MG
12.5 TABLET ORAL THREE TIMES A DAY
Refills: 0 | Status: DISCONTINUED | OUTPATIENT
Start: 2024-11-08 | End: 2024-11-09

## 2024-11-08 RX ADMIN — Medication 12.5 MILLIGRAM(S): at 20:31

## 2024-11-08 RX ADMIN — Medication 30 MILLIGRAM(S): at 16:37

## 2024-11-08 RX ADMIN — Medication 100 MILLIGRAM(S): at 20:31

## 2024-11-08 RX ADMIN — Medication 40 MILLIGRAM(S): at 20:31

## 2024-11-08 RX ADMIN — AZITHROMYCIN DIHYDRATE 500 MILLIGRAM(S): 200 POWDER, FOR SUSPENSION ORAL at 19:17

## 2024-11-08 NOTE — H&P ADULT - ASSESSMENT
84F with phmx htn hld chronic anemia presents with weakness and subjective shortness of breath after she was found to have low hemoglobin at [facility]. Patient was found to have new onset afib.       #acute on chronic symptomatic anemia hgb 6.9  - s/p 1u prbcs. f/u 10pm cbc  - iron studies, b12 folate levels   - check fecal occult blood    #shortness of breath ? pneumonia   #fluid overload BNP >70k  #elevated troponin 965  #new onset afib   - urine/ blood / sputum cultures/ RVP  - ceftriaxone / azithro x 1 (patient began abx course outpatient)  - check echo   - trend troponin 965 ->  - cardiology consult   - lasix 40 iv x1 now   - fluid restriction, strict is/os   - start metoprolol 12.5 TID. trial 5mg iv for HR >130     DNR/DNI trial EDIE METZGER in chart

## 2024-11-08 NOTE — PATIENT PROFILE ADULT - FALL HARM RISK - HARM RISK INTERVENTIONS

## 2024-11-08 NOTE — H&P ADULT - NSHPLABSRESULTS_GEN_ALL_CORE
6.9    18.51 )-----------( 434      ( 08 Nov 2024 12:15 )             21.0       11-08    132[L]  |  101  |  58[H]  ----------------------------<  124[H]  4.4   |  14[L]  |  2.1[H]    Ca    8.1[L]      08 Nov 2024 12:15    TPro  6.3  /  Alb  3.3[L]  /  TBili  <0.2  /  DBili  x   /  AST  43[H]  /  ALT  25  /  AlkPhos  146[H]  11-08        < from: Xray Chest 1 View-PORTABLE IMMEDIATE (Xray Chest 1 View-PORTABLE IMMEDIATE .) (11.08.24 @ 14:14) >    Impression:    Limited study.    Bilateral opacities and effusions.    Cardiomegaly.    < end of copied text >

## 2024-11-08 NOTE — CONSULT NOTE ADULT - SUBJECTIVE AND OBJECTIVE BOX
HPI:        HPI-Cardiology   Pt with the above Hx and HPI, evaluated at bedside. Cardiology consulted for ____. Hx and HPI obtained from chart and Primary team. Pt denies any CP, SOB at rest or exertion and denies any other associated cardiac symptoms. Radiologic tests and hospital records were reviewed, as well as previous notes on this patient.    PAST MEDICAL & SURGICAL HISTORY  Arthritis    No significant past surgical history        FAMILY HISTORY:  FAMILY HISTORY:      SOCIAL HISTORY:  []smoker  []Alcohol  []Drug    ALLERGIES:  No Known Allergies      MEDICATIONS:  MEDICATIONS  (STANDING):    MEDICATIONS  (PRN):      HOME MEDICATIONS:  Home Medications:  Aspir 81 oral delayed release tablet: 1 tab(s) orally once a day (26 Jun 2022 11:54)  ezetimibe 10 mg oral tablet: 1 tab(s) orally once a day (26 Jun 2022 11:54)  ferrous sulfate 324 mg (65 mg elemental iron) oral tablet: orally once a day (26 Jun 2022 11:54)  metoprolol tartrate 50 mg oral tablet: 1 tab(s) orally 2 times a day (26 Jun 2022 11:54)      VITALS:   T(F): 98.1 (11-08 @ 18:00), Max: 98.5 (11-08 @ 15:18)  HR: 94 (11-08 @ 18:00) (57 - 109)  BP: 114/64 (11-08 @ 18:00) (104/61 - 144/62)  BP(mean): 75 (11-08 @ 14:42) (75 - 75)  RR: 18 (11-08 @ 18:00) (16 - 18)  SpO2: 98% (11-08 @ 18:00) (98% - 100%)    I&O's Summary      REVIEW OF SYSTEMS:      PHYSICAL EXAM:      LABS:                        6.9    18.51 )-----------( 434      ( 08 Nov 2024 12:15 )             21.0     11-08    132[L]  |  101  |  58[H]  ----------------------------<  124[H]  4.4   |  14[L]  |  2.1[H]    Ca    8.1[L]      08 Nov 2024 12:15    TPro  6.3  /  Alb  3.3[L]  /  TBili  <0.2  /  DBili  x   /  AST  43[H]  /  ALT  25  /  AlkPhos  146[H]  11-08              Troponin trend:            RADIOLOGY:  -CXR:  < from: Xray Chest 1 View-PORTABLE IMMEDIATE (Xray Chest 1 View-PORTABLE IMMEDIATE .) (11.08.24 @ 14:14) >  Impression:    Limited study.    Bilateral opacities and effusions.    Cardiomegaly.    --- End of Report ---            RHINA KOHLI MD; Attending Radiologist  This document has been electronically signed. Nov 8 2024  2:16PM    < end of copied text >      ECG:  < from: 12 Lead ECG (11.08.24 @ 12:41) >  Ventricular Rate 114 BPM    Atrial Rate 101 BPM    QRS Duration 140 ms    Q-T Interval 372 ms    QTC Calculation(Bazett) 512 ms    R Axis 74 degrees    T Axis -85 degrees    Diagnosis Line Atrial fibrillation with rapid ventricular response  Non-specific intra-ventricular conduction block  Cannot rule out Anterior infarct , age undetermined  T wave abnormality, consider inferolateral ischemia  Abnormal ECG    Confirmed by Felton Hernandes ([id number]) on 11/8/2024 1:51:47 PM    < end of copied text >      TELEMETRY EVENTS:   HPI:            PAST MEDICAL & SURGICAL HISTORY  Arthritis    No significant past surgical history        FAMILY HISTORY:  FAMILY HISTORY:      SOCIAL HISTORY:  []smoker  []Alcohol  []Drug    ALLERGIES:  No Known Allergies      MEDICATIONS:  MEDICATIONS  (STANDING):    MEDICATIONS  (PRN):      HOME MEDICATIONS:  Home Medications:  Aspir 81 oral delayed release tablet: 1 tab(s) orally once a day (26 Jun 2022 11:54)  ezetimibe 10 mg oral tablet: 1 tab(s) orally once a day (26 Jun 2022 11:54)  ferrous sulfate 324 mg (65 mg elemental iron) oral tablet: orally once a day (26 Jun 2022 11:54)  metoprolol tartrate 50 mg oral tablet: 1 tab(s) orally 2 times a day (26 Jun 2022 11:54)      VITALS:   T(F): 98.1 (11-08 @ 18:00), Max: 98.5 (11-08 @ 15:18)  HR: 94 (11-08 @ 18:00) (57 - 109)  BP: 114/64 (11-08 @ 18:00) (104/61 - 144/62)  BP(mean): 75 (11-08 @ 14:42) (75 - 75)  RR: 18 (11-08 @ 18:00) (16 - 18)  SpO2: 98% (11-08 @ 18:00) (98% - 100%)    I&O's Summary      REVIEW OF SYSTEMS:  REVIEW OF SYSTEMS:    CONSTITUTIONAL: No fevers or chills  EYES/ENT: No visual changes;  No vertigo or throat pain   NECK: No pain or stiffness  RESPIRATORY: No cough, wheezing, hemoptysis; No shortness of breath  CARDIOVASCULAR: No chest pain or palpitations  GASTROINTESTINAL: No abdominal or epigastric pain. No nausea, vomiting, or hematemesis; No diarrhea or constipation. No melena or hematochezia.  GENITOURINARY: No dysuria, frequency or hematuria  NEUROLOGICAL: No numbness or weakness  SKIN: No itching, rashes        PHYSICAL EXAM:      GENERAL:  83y/o Female NAD, resting comfortably.  HEAD:  Atraumatic, Normocephalic  EYES: EOMI, PERRLA, conjunctiva and sclera clear  NECK: Supple, No JVD, no cervical lymphadenopathy, non-tender  CHEST/LUNG: B/L breath sounds; mild wheeze left side, no rhonchi, or rales  HEART: irregular; S1&S2  ABDOMEN: Soft, Nontender, Nondistended x 4 quadrants; Bowel sounds present  EXTREMITIES:   Peripheral Pulses Present  PSYCH: AAOx3, cooperative, appropriate  NEUROLOGY: WNL  SKIN: pale      LABS:                        6.9    18.51 )-----------( 434      ( 08 Nov 2024 12:15 )             21.0     11-08    132[L]  |  101  |  58[H]  ----------------------------<  124[H]  4.4   |  14[L]  |  2.1[H]    Ca    8.1[L]      08 Nov 2024 12:15    TPro  6.3  /  Alb  3.3[L]  /  TBili  <0.2  /  DBili  x   /  AST  43[H]  /  ALT  25  /  AlkPhos  146[H]  11-08              Troponin trend:            RADIOLOGY:  -CXR:  < from: Xray Chest 1 View-PORTABLE IMMEDIATE (Xray Chest 1 View-PORTABLE IMMEDIATE .) (11.08.24 @ 14:14) >  Impression:    Limited study.    Bilateral opacities and effusions.    Cardiomegaly.    --- End of Report ---            RHINA KOHLI MD; Attending Radiologist  This document has been electronically signed. Nov 8 2024  2:16PM    < end of copied text >      ECG:  < from: 12 Lead ECG (11.08.24 @ 12:41) >  Ventricular Rate 114 BPM    Atrial Rate 101 BPM    QRS Duration 140 ms    Q-T Interval 372 ms    QTC Calculation(Bazett) 512 ms    R Axis 74 degrees    T Axis -85 degrees    Diagnosis Line Atrial fibrillation with rapid ventricular response  Non-specific intra-ventricular conduction block  Cannot rule out Anterior infarct , age undetermined  T wave abnormality, consider inferolateral ischemia  Abnormal ECG    Confirmed by Felton Hernandes ([id number]) on 11/8/2024 1:51:47 PM    < end of copied text >      TELEMETRY EVENTS:

## 2024-11-08 NOTE — H&P ADULT - HISTORY OF PRESENT ILLNESS
84F with phmx htn hld chronic anemia presents after she was found to have low hemoglobin at [facility]. Upon presentation tot he ER, patient was found to have new onset afib. Patient reports feeling short of breath for the last few days and was empirically started on antibiotics for the last three days (not sure which abx). Hgb outpatient was 5.9, repeat upon ER presentation was 6.9. Per daughter, patient has a long hisotry of anemia and has begun requiring transfusions this year. She has a colonoscopy and endoscopy workup which was reportedly negative. Patient reports some cough and shortness of breath. She denies chest pain, nausea, vomiting, or dysuria.

## 2024-11-08 NOTE — H&P ADULT - NS ATTEND AMEND GEN_ALL_CORE FT
84F with PMH notable for hypetension, hyperlipidemia,  chronic anemia, frequent ED visits for anemia requiring transfusions, presenting from nursing home after found to have Hb of 5.8. Repeat Hb here 6.8. Patient reports associated SOB with dry cough for the last 1 week for which she received three days of antibiotics at the nursing home. She is at . Patient denies chest pain. Denies bleeding from orifices. Reports that she has anemia for three years now and has undergone extensive workup including C-scope 3 years ago which was unremarkable. Has chronically dark stool due to iron tablets. No fevers or chills. Comfortable on the bed. Labs notable for leukocytosis with left shift, elevated troponin at 900s. KINGA noted. Hb 6.8. Patient received 30mg of diltiazem,    General: NAD  HEETN: NCAT  Lungs: scattered crackles throughout lung fields  Heart: irregular  Abdomen: Non-distended  Extremities: No edema    #Afib  #Symptomatic Anemia  #Leukocytosis with L shift  #CHF  #NSTEMI type 2 secondary to demand ischemia.  Patient's presentation most consistent with symptomatic anemia causing SOB and atrial fibrillation. In addition with elevated BNP and bilateral pleural effusions/cardiomegaly, suspect underlying CHF, either tachy-induced or preceding the event. Leukocytosis noted, patient reports mild cough and SOB for 1 week after an aspiration event but currently at  and without fevers. Patient already received 1U of PRBC, plan to keep Hb>8 given underlying Afib. Repeat CBC at 7pm and in AM. Cause of anemia unclear but overall patient without melena or hematemesis, no clinical evidence of bleeding and reported negative colonoscopy 2-3 years ago. Will send over B12, iron studies and folate, with SPEP to rule out multiple myeloma. MDS is also in the differential but low concern for GI bleed currently. Favor to continue ASA given elevated troponins, which are likely demand ischemia given lack of chest pain. Will repeat trops. Cards consult. For the volume overload will give one dose of Lasix 40mg now with strict I+Os and order TTE. For the afib, will start low dose metoprolol 12.5mg TID. For the leukocytosis will obtain BCx, sputum Cx, Urine Cx , leg/strep ur ag. Start ceftriaxone/azithro. Plan to obtain CT in AM to further assess lung parenchyma and de-escalate abx. Rest per PA note

## 2024-11-08 NOTE — PATIENT PROFILE ADULT - BILL PAYMENT
"Assessment & Plan  21 y.o.  at 37w2d presenting for routine prenatal visit.       Problem List       Not immune to HBV    Overview     Needs HBV vaccine booster postpartum         37 weeks gestation of pregnancy    Overview     Prenatal labs wnl  [X]MSAFP negative  [X] TDAP vaccine   [X]Delivery consent  [ ]Contraception  [X] GBS neg  Would like to await spontaneous labor              ____________________________________________________________  Subjective  She is without complaint.   She denies contractions, loss of fluid, or vaginal bleeding.   She feels regular fetal movements.       Objective  /78 (BP Location: Right arm, Patient Position: Sitting)   Pulse 82   Ht 5' 3\" (1.6 m)   Wt 62 kg (136 lb 9.6 oz)   LMP  (LMP Unknown)   BMI 24.20 kg/m²   FHR: 150's via doppler    Physical Exam  Constitutional:       Appearance: She is well-developed.   Cardiovascular:      Rate and Rhythm: Normal rate and regular rhythm.      Heart sounds: Normal heart sounds. No murmur heard.     No friction rub. No gallop.   Pulmonary:      Effort: Pulmonary effort is normal. No respiratory distress.      Breath sounds: No wheezing.   Abdominal:      Palpations: Abdomen is soft.      Tenderness: There is no abdominal tenderness.   Musculoskeletal:         General: No tenderness.   Neurological:      Mental Status: She is alert and oriented to person, place, and time.   Vitals reviewed.          Patient's Active Problem List  Patient Active Problem List   Diagnosis    Not immune to HBV    37 weeks gestation of pregnancy           Tanisha Rai MD  7/10/2024  11:52 AM          " no

## 2024-11-08 NOTE — PATIENT PROFILE ADULT - FUNCTIONAL ASSESSMENT - DAILY ACTIVITY 2.
Implemented All Fall with Harm Risk Interventions:  [place] to call system. Call bell, personal items and telephone within reach. Instruct patient to call for assistance. Room bathroom lighting operational. Non-slip footwear when patient is off stretcher. Physically safe environment: no spills, clutter or unnecessary equipment. Stretcher in lowest position, wheels locked, appropriate side rails in place. Provide visual cue, wrist band, yellow gown, etc. Monitor gait and stability. Monitor for mental status changes and reorient to person, place, and time. Review medications for side effects contributing to fall risk. Reinforce activity limits and safety measures with patient and family. Provide visual clues: red socks.
2 = A lot of assistance

## 2024-11-08 NOTE — ED PROVIDER NOTE - PHYSICAL EXAMINATION
CONST: Well appearing in NAD  EYES: PERRL, EOMI, Sclera and conjunctiva clear.   ENT: No nasal discharge. Oropharynx normal appearing  NECK: Non-tender, no meningeal signs. normal ROM. supple   CARD: Normal S1 S2; Normal rate and rhythm  RESP: Equal BS B/L, No wheezes, rhonchi or rales. No distress  GI: Soft, non-tender, non-distended. no cva tenderness. normal BS  MS: Normal ROM in all extremities. No midline spinal tenderness. pulses 2 +. no calf tenderness or swelling  SKIN: Pale, Warm, dry, no acute rashes. Good turgor

## 2024-11-08 NOTE — CONSULT NOTE ADULT - ASSESSMENT
HPI-Cardiology:  84 year old female with pmhx of anemia, on iron transfusions, history of blood transfusions, sent in from [facility] for low HgB.  Patient seen and evaluated at bedside.  Cardiology consulted for elevated trops/ Afib/ EKG.  At this time, patient denies any chest pain or other associated cardiac symptoms.  She does c/o sob, though it has subsided a bit since she received a blood transfusion in the ER.    Radiologic tests and hospital records were reviewed, as well as previous notes on this patient.    Patient does not have an outpatient cardiologist.    *Troponemia in the setting of anemia and new onset atrial fibrillation  Control rate  Transfuse prn  Repeat EKG  Obtain TTE  Trend troponins  Heme Onc f/u  Discussed with cardiac fellow HPI-Cardiology:  84 year old female with pmhx of anemia, on iron transfusions, history of blood transfusions, sent in from [facility] for low HgB.  Patient seen and evaluated at bedside.  Cardiology consulted for elevated trops/ Afib/ EKG.  At this time, patient denies any chest pain or other associated cardiac symptoms.  She does c/o sob, though it has subsided a bit since she received a blood transfusion in the ER.    Radiologic tests and hospital records were reviewed, as well as previous notes on this patient.    Patient does not have an outpatient cardiologist.    *Troponemia in the setting of anemia and new onset atrial fibrillation  Control rate  Transfuse prn  Repeat EKG  Obtain TTE  Trend troponins  Heme Onc f/u  Discussed with Dr. Mccurdy    Daughter Carlota asked that the medical teams speak to her regarding her mothers plan of care.

## 2024-11-08 NOTE — ED PROVIDER NOTE - EKG/XRAY ADDITIONAL INFORMATION
EKG irregularly irregular, atrial fibrillation, with RVR, rate 114, left bundle branch block, no Sgarbossa's criteria met, inferolateral T wave inversions/slight ST depressions, no ST elevation, QTc 512

## 2024-11-08 NOTE — CONSULT NOTE ADULT - NS ATTEND AMEND GEN_ALL_CORE FT
84 year old female with pmhx of anemia, on iron transfusions, history of blood transfusions, sent in from [facility] for low HgB. Patient has elevated troponin. In afib rate controlled. She is bed to chair . She does not walk. No chest pain. No SOB. No palpitations. No complaints now. Troponin elevation because demand ischemia secondary anemia. Need transfuse. She needs echo . Can do out patient. Afib rate controlled. Not candidate now for AC. Outpatient follow CBC closely. Transfuse prn 84 year old female with pmhx of anemia, on iron transfusions, history of blood transfusions, sent in from [facility] for low HgB. Patient has elevated troponin. In afib rate controlled. She is bed to chair . She does not walk. No chest pain. No SOB. No palpitations. No complaints now. Troponin elevation because demand ischemia secondary anemia. Need transfuse. She needs echo . Can do out patient. Afib rate controlled. Not candidate now for AC. Outpatient follow CBC closely. Transfuse prn.  Watch for CHF. Lasix prn.

## 2024-11-08 NOTE — ED PROVIDER NOTE - CARE PLAN
Principal Discharge DX:	Anemia  Secondary Diagnosis:	Atrial fibrillation  Secondary Diagnosis:	Elevated troponin   1

## 2024-11-08 NOTE — ED PROVIDER NOTE - CLINICAL SUMMARY MEDICAL DECISION MAKING FREE TEXT BOX
84-year-old female with a past medical history of anemia, requiring blood transfusions presents with generalized weakness and fatigue as well as shortness of breath.  Recently diagnosed with pneumonia.  No chest pain.  On exam nontoxic, vital signs noted, normal work of breathing, lungs clear bilaterally, heart irregular, no murmurs, no peripheral edema, abdomen benign.  Labs notable for hemoglobin 6.9, will transfuse.  Chemistry notable for anion gap metabolic acidosis and KINGA.  Also has elevated troponin in the setting of having RVR.  No chest pain.  Does have significantly elevated BNP so question if infiltrates on chest x-ray related to heart failure rather than pneumonia.  Given this and intermittently has heart rate from the 80s to 110s, will give diltiazem and admit to telemetry.

## 2024-11-08 NOTE — H&P ADULT - NSHPPHYSICALEXAM_GEN_ALL_CORE
VITALS:   T(C): 36.7 (11-08-24 @ 18:00), Max: 36.9 (11-08-24 @ 15:18)  HR: 94 (11-08-24 @ 18:00) (57 - 109)  BP: 114/64 (11-08-24 @ 18:00) (104/61 - 144/62)  RR: 18 (11-08-24 @ 18:00) (16 - 18)  SpO2: 98% (11-08-24 @ 18:00) (98% - 100%)    GENERAL: NAD, lying in bed comfortably  HEAD:  Atraumatic, normocephalic  EYES: EOMI, PERRLA, conjunctiva and sclera clear  ENT: Moist mucous membranes  NECK: elevated JVD   HEART: irregular heart rate  LUNGS: Unlabored respirations, on . diffuse crackles   ABDOMEN: Soft, nontender, nondistended, +BS  EXTREMITIES: no significant peripheral edema   NERVOUS SYSTEM:  A&Ox3, no focal deficits   SKIN: No rashes or lesions

## 2024-11-08 NOTE — ED PROVIDER NOTE - OBJECTIVE STATEMENT
84 year old female with pmhx of anemia, on iron transfusions, history of blood transfusions, sent in from [facility] for low Hb. pt admits to weakness and sob. no bleeding. no chest pain

## 2024-11-09 LAB
ALBUMIN SERPL ELPH-MCNC: 3.1 G/DL — LOW (ref 3.5–5.2)
ALP SERPL-CCNC: 140 U/L — HIGH (ref 30–115)
ALT FLD-CCNC: 20 U/L — SIGNIFICANT CHANGE UP (ref 0–41)
ANION GAP SERPL CALC-SCNC: 17 MMOL/L — HIGH (ref 7–14)
AST SERPL-CCNC: 30 U/L — SIGNIFICANT CHANGE UP (ref 0–41)
BILIRUB SERPL-MCNC: 0.3 MG/DL — SIGNIFICANT CHANGE UP (ref 0.2–1.2)
BUN SERPL-MCNC: 60 MG/DL — HIGH (ref 10–20)
CALCIUM SERPL-MCNC: 8.4 MG/DL — SIGNIFICANT CHANGE UP (ref 8.4–10.5)
CHLORIDE SERPL-SCNC: 107 MMOL/L — SIGNIFICANT CHANGE UP (ref 98–110)
CO2 SERPL-SCNC: 12 MMOL/L — LOW (ref 17–32)
CREAT SERPL-MCNC: 1.9 MG/DL — HIGH (ref 0.7–1.5)
EGFR: 26 ML/MIN/1.73M2 — LOW
FLUAV AG NPH QL: SIGNIFICANT CHANGE UP
FLUBV AG NPH QL: SIGNIFICANT CHANGE UP
GLUCOSE SERPL-MCNC: 86 MG/DL — SIGNIFICANT CHANGE UP (ref 70–99)
HCT VFR BLD CALC: 27.6 % — LOW (ref 37–47)
HCT VFR BLD CALC: 32.8 % — LOW (ref 37–47)
HGB BLD-MCNC: 10.8 G/DL — LOW (ref 12–16)
HGB BLD-MCNC: 9.3 G/DL — LOW (ref 12–16)
LEGIONELLA AG UR QL: NEGATIVE — SIGNIFICANT CHANGE UP
MAGNESIUM SERPL-MCNC: 1.9 MG/DL — SIGNIFICANT CHANGE UP (ref 1.8–2.4)
MCHC RBC-ENTMCNC: 27.2 PG — SIGNIFICANT CHANGE UP (ref 27–31)
MCHC RBC-ENTMCNC: 27.3 PG — SIGNIFICANT CHANGE UP (ref 27–31)
MCHC RBC-ENTMCNC: 32.9 G/DL — SIGNIFICANT CHANGE UP (ref 32–37)
MCHC RBC-ENTMCNC: 33.7 G/DL — SIGNIFICANT CHANGE UP (ref 32–37)
MCV RBC AUTO: 80.7 FL — LOW (ref 81–99)
MCV RBC AUTO: 83 FL — SIGNIFICANT CHANGE UP (ref 81–99)
NRBC # BLD: 0 /100 WBCS — SIGNIFICANT CHANGE UP (ref 0–0)
NRBC # BLD: 0 /100 WBCS — SIGNIFICANT CHANGE UP (ref 0–0)
PHOSPHATE SERPL-MCNC: 4.2 MG/DL — SIGNIFICANT CHANGE UP (ref 2.1–4.9)
PLATELET # BLD AUTO: 378 K/UL — SIGNIFICANT CHANGE UP (ref 130–400)
PLATELET # BLD AUTO: 459 K/UL — HIGH (ref 130–400)
PMV BLD: 9.8 FL — SIGNIFICANT CHANGE UP (ref 7.4–10.4)
PMV BLD: 9.9 FL — SIGNIFICANT CHANGE UP (ref 7.4–10.4)
POTASSIUM SERPL-MCNC: 4.2 MMOL/L — SIGNIFICANT CHANGE UP (ref 3.5–5)
POTASSIUM SERPL-SCNC: 4.2 MMOL/L — SIGNIFICANT CHANGE UP (ref 3.5–5)
PROT SERPL-MCNC: 6.4 G/DL — SIGNIFICANT CHANGE UP (ref 6–8)
RBC # BLD: 3.42 M/UL — LOW (ref 4.2–5.4)
RBC # BLD: 3.95 M/UL — LOW (ref 4.2–5.4)
RBC # FLD: 15.9 % — HIGH (ref 11.5–14.5)
RBC # FLD: 16.2 % — HIGH (ref 11.5–14.5)
RSV RNA NPH QL NAA+NON-PROBE: SIGNIFICANT CHANGE UP
S PNEUM AG UR QL: NEGATIVE — SIGNIFICANT CHANGE UP
SARS-COV-2 RNA SPEC QL NAA+PROBE: SIGNIFICANT CHANGE UP
SODIUM SERPL-SCNC: 136 MMOL/L — SIGNIFICANT CHANGE UP (ref 135–146)
WBC # BLD: 15.27 K/UL — HIGH (ref 4.8–10.8)
WBC # BLD: 16.11 K/UL — HIGH (ref 4.8–10.8)
WBC # FLD AUTO: 15.27 K/UL — HIGH (ref 4.8–10.8)
WBC # FLD AUTO: 16.11 K/UL — HIGH (ref 4.8–10.8)

## 2024-11-09 PROCEDURE — 99223 1ST HOSP IP/OBS HIGH 75: CPT | Mod: FS

## 2024-11-09 PROCEDURE — 93010 ELECTROCARDIOGRAM REPORT: CPT

## 2024-11-09 PROCEDURE — 71250 CT THORAX DX C-: CPT | Mod: 26

## 2024-11-09 PROCEDURE — 99222 1ST HOSP IP/OBS MODERATE 55: CPT

## 2024-11-09 PROCEDURE — 99223 1ST HOSP IP/OBS HIGH 75: CPT

## 2024-11-09 PROCEDURE — 99232 SBSQ HOSP IP/OBS MODERATE 35: CPT

## 2024-11-09 RX ORDER — FUROSEMIDE 40 MG
40 TABLET ORAL ONCE
Refills: 0 | Status: COMPLETED | OUTPATIENT
Start: 2024-11-09 | End: 2024-11-09

## 2024-11-09 RX ORDER — METOPROLOL TARTRATE 50 MG
12.5 TABLET ORAL
Refills: 0 | Status: DISCONTINUED | OUTPATIENT
Start: 2024-11-09 | End: 2024-11-09

## 2024-11-09 RX ORDER — AMPICILLIN AND SULBACTAM 2; 1 G/1; G/1
3 INJECTION, POWDER, FOR SOLUTION INTRAMUSCULAR; INTRAVENOUS
Refills: 0 | Status: DISCONTINUED | OUTPATIENT
Start: 2024-11-09 | End: 2024-11-11

## 2024-11-09 RX ADMIN — Medication 40 MILLIGRAM(S): at 04:38

## 2024-11-09 RX ADMIN — PANTOPRAZOLE SODIUM 40 MILLIGRAM(S): 40 TABLET, DELAYED RELEASE ORAL at 04:39

## 2024-11-09 RX ADMIN — Medication 250 MILLILITER(S): at 14:46

## 2024-11-09 RX ADMIN — AMPICILLIN AND SULBACTAM 200 GRAM(S): 2; 1 INJECTION, POWDER, FOR SOLUTION INTRAMUSCULAR; INTRAVENOUS at 17:09

## 2024-11-09 RX ADMIN — AZITHROMYCIN DIHYDRATE 500 MILLIGRAM(S): 200 POWDER, FOR SUSPENSION ORAL at 12:26

## 2024-11-09 RX ADMIN — Medication 12.5 MILLIGRAM(S): at 04:39

## 2024-11-09 RX ADMIN — Medication 325 MILLIGRAM(S): at 12:26

## 2024-11-09 RX ADMIN — EZETIMIBE 10 MILLIGRAM(S): 10 TABLET ORAL at 12:26

## 2024-11-09 RX ADMIN — HEPARIN SODIUM 5000 UNIT(S): 10000 INJECTION INTRAVENOUS; SUBCUTANEOUS at 04:39

## 2024-11-09 NOTE — PHYSICAL THERAPY INITIAL EVALUATION ADULT - PERTINENT HX OF CURRENT PROBLEM, REHAB EVAL
84F with phmx htn hld chronic anemia presents after she was found to have low hemoglobin at [facility]. Upon presentation tot he ER, patient was found to have new onset afib. Patient reports feeling short of breath for the last few days and was empirically started on antibiotics for the last three days (not sure which abx). Hgb outpatient was 5.9, repeat upon ER presentation was 6.9. Per daughter, patient has a long hisotry of anemia and has begun requiring transfusions this year. She has a colonoscopy and endoscopy workup which was reportedly negative. Patient reports some cough and shortness of breath. She denies chest pain, nausea, vomiting, or dysuria.

## 2024-11-09 NOTE — CONSULT NOTE ADULT - SUBJECTIVE AND OBJECTIVE BOX
84-year-old woman with history of HTN, DL, chronic anemia requiring multiple transfusions, admitted with symptomatic anemia with a hemoglobin of 5.8 for which she is being evaluated.    Patient was sent from the nursing home with symptomatic anemia and a hemoglobin of 5.8.  Repeat in the ER showed a hemoglobin of 6.8.  Patient complains of shortness of breath, dry cough, and easy fatigability for the past week.  She was thought to have a respiratory tract infection and was given antibiotics in the nursing home with no improvement.  She denies any objective evidence of bleeding and has no melena or hematochezia, no hematemesis or coffee-ground emesis.  Has chronically dark stool due to iron tablets. No fevers or chills.  She denies any obvious blood losses from the genitourinary system.      Colonoscopy at outside facility 3 years ago which was unremarkable.     Admission, she was found to be in A-fib and have troponinemia, elevated BNP <70k, but was otherwise hemodynamically stable.      CXR showed bilateral opacities, effusions and cardiomegaly.    CT chest without contrast  1.  Left lower lobe pneumonia with underlying parapneumonic effusion,   possibly partially loculated along the upper lung field.  2.  Diffusely sclerotic vertebral column, nonspecific possibly related to   renal osteodystrophy if there is a history of chronic renal disease.      PAST MEDICAL & SURGICAL HISTORY:  Arthritis  HLD (hyperlipidemia)  HTN (hypertension)    No significant past surgical history      FAMILY HISTORY: non contributory      Social History:  neg x 3     Home Medications:  Aspir 81 oral delayed release tablet: 1 tab(s) orally once a day (26 Jun 2022 11:54)  ezetimibe 10 mg oral tablet: 1 tab(s) orally once a day (26 Jun 2022 11:54)  famotidine 20 mg oral tablet: 1 tab(s) orally once a day (08 Nov 2024 18:37)  ferrous sulfate 324 mg (65 mg elemental iron) oral tablet: orally once a day (26 Jun 2022 11:54)    MEDICATIONS  (STANDING):  azithromycin   Tablet 500 milliGRAM(s) Oral daily  cefTRIAXone   IVPB 1000 milliGRAM(s) IV Intermittent every 24 hours  ezetimibe 10 milliGRAM(s) Oral daily  ferrous    sulfate 325 milliGRAM(s) Oral daily  heparin   Injectable 5000 Unit(s) SubCutaneous every 12 hours  metoprolol tartrate 12.5 milliGRAM(s) Oral three times a day  pantoprazole    Tablet 40 milliGRAM(s) Oral before breakfast    MEDICATIONS  (PRN):      Allergies  No Known Allergies      Review of Systems:   Constitutional:  No Fever, No Chills  ENT/Mouth:  No Hearing Changes,  No Difficulty Swallowing  Eyes:  No Eye Pain, No Vision Changes  Cardiovascular:  No Chest Pain, No Palpitations  Respiratory:  No Cough, No Dyspnea  Gastrointestinal:  As described in HPI  Musculoskeletal:  No Joint Swelling, No Back Pain  Skin:  No Skin Lesions, No Jaundice  Neuro:  No Syncope, No Dizziness  Heme/Lymph:  No Bruising, No Bleeding.          Physical Examination:  T(C): 36.3 (11-09-24 @ 08:35), Max: 37.2 (11-09-24 @ 05:00)  HR: 93 (11-09-24 @ 08:35) (57 - 109)  BP: 123/62 (11-09-24 @ 08:35) (104/58 - 144/62)  RR: 18 (11-09-24 @ 08:35) (16 - 18)  SpO2: 100% (11-09-24 @ 08:35) (98% - 100%)  Height (cm): 167.6 (11-08-24 @ 11:35)  Weight (kg): 61.2 (11-08-24 @ 11:35)    11-08-24 @ 07:01  -  11-09-24 @ 07:00  --------------------------------------------------------  IN: 0 mL / OUT: 1700 mL / NET: -1700 mL        Constitutional: No acute distress.  Eyes:. Conjunctivae are clear, Sclera is non-icteric.  Ears Nose and Throat: The external ears are normal appearing,  Oral mucosa is pink and moist.  Respiratory:  No signs of respiratory distress. Lung sounds are clear bilaterally.  Cardiovascular:  S1 S2, Regular rate and rhythm.  GI: Abdomen is soft, symmetric, and non-tender without distention. There are no visible lesions or scars. Bowel sounds are present and normoactive in all four quadrants. No masses, hepatomegaly, or splenomegaly are noted.   Neuro: No Tremor, No involuntary movements  Skin: No rashes, No Jaundice.          Data:                        7.7    15.52 )-----------( 381      ( 08 Nov 2024 19:51 )             23.7     Hgb Trend:  7.7  11-08-24 @ 19:51  6.9  11-08-24 @ 12:15        11-08    132[L]  |  101  |  58[H]  ----------------------------<  124[H]  4.4   |  14[L]  |  2.1[H]    Ca    8.1[L]      08 Nov 2024 12:15    TPro  6.3  /  Alb  3.3[L]  /  TBili  <0.2  /  DBili  x   /  AST  43[H]  /  ALT  25  /  AlkPhos  146[H]  11-08    Liver panel trend:  TBili <0.2   /   AST 43   /   ALT 25   /   AlkP 146   /   Tptn 6.3   /   Alb 3.3    /   DBili --      11-08              Radiology:  < from: CT Chest No Cont (11.09.24 @ 09:29) >  1.  Left lower lobe pneumonia with underlying parapneumonic effusion,   possibly partially loculated along the upper lung field.  2.  Diffusely sclerotic vertebral column, nonspecific possibly related to   renal osteodystrophy if there is a history of chronic renal disease.    < end of copied text >       84-year-old woman with history of HTN, DL, chronic anemia requiring multiple transfusions, admitted with symptomatic anemia with a hemoglobin of 5.8 for which she is being evaluated.    Patient was sent from the nursing home with symptomatic anemia and a hemoglobin of 5.8.  Repeat in the ER showed a hemoglobin of 6.8.  Patient complains of shortness of breath, dry cough, and easy fatigability for the past week.  She was thought to have a respiratory tract infection and was given antibiotics in the nursing home with no improvement.  She denies any objective evidence of bleeding and has no melena or hematochezia, no hematemesis or coffee-ground emesis.  Has chronically dark stool due to iron tablets. No fevers or chills.  She denies any obvious blood losses from the genitourinary system.      Colonoscopy at outside facility 3 years ago which was unremarkable.     Admission, she was found to be in A-fib and have troponinemia, elevated BNP <70k, but was otherwise hemodynamically stable.      CXR showed bilateral opacities, effusions and cardiomegaly.    CT chest without contrast  1.  Left lower lobe pneumonia with underlying parapneumonic effusion,   possibly partially loculated along the upper lung field.  2.  Diffusely sclerotic vertebral column, nonspecific possibly related to   renal osteodystrophy if there is a history of chronic renal disease.      Last EGD and colonoscopy in September 2017:   9/7/2017 EGD: Hiatal hernia, gastritis, otherwise unremarkable  Unremarkable gastric and duodenal biopsies    9/7/2017 colonoscopy: 5 mm TA in sigmoid, SCAD in the sigmoid, mild diverticulosis in the sigmoid colon, external hemorrhoids, otherwise unremarkable.      PAST MEDICAL & SURGICAL HISTORY:  Arthritis  HLD (hyperlipidemia)  HTN (hypertension)    No significant past surgical history      FAMILY HISTORY: non contributory      Social History:  neg x 3     Home Medications:  Aspir 81 oral delayed release tablet: 1 tab(s) orally once a day (26 Jun 2022 11:54)  ezetimibe 10 mg oral tablet: 1 tab(s) orally once a day (26 Jun 2022 11:54)  famotidine 20 mg oral tablet: 1 tab(s) orally once a day (08 Nov 2024 18:37)  ferrous sulfate 324 mg (65 mg elemental iron) oral tablet: orally once a day (26 Jun 2022 11:54)    MEDICATIONS  (STANDING):  azithromycin   Tablet 500 milliGRAM(s) Oral daily  cefTRIAXone   IVPB 1000 milliGRAM(s) IV Intermittent every 24 hours  ezetimibe 10 milliGRAM(s) Oral daily  ferrous    sulfate 325 milliGRAM(s) Oral daily  heparin   Injectable 5000 Unit(s) SubCutaneous every 12 hours  metoprolol tartrate 12.5 milliGRAM(s) Oral three times a day  pantoprazole    Tablet 40 milliGRAM(s) Oral before breakfast    MEDICATIONS  (PRN):      Allergies  No Known Allergies      Review of Systems:   Constitutional:  No Fever, No Chills  ENT/Mouth:  No Hearing Changes,  No Difficulty Swallowing  Eyes:  No Eye Pain, No Vision Changes  Cardiovascular:  No Chest Pain, No Palpitations  Respiratory:  No Cough, No Dyspnea  Gastrointestinal:  As described in HPI  Musculoskeletal:  No Joint Swelling, No Back Pain  Skin:  No Skin Lesions, No Jaundice  Neuro:  No Syncope, No Dizziness  Heme/Lymph:  No Bruising, No Bleeding.          Physical Examination:  T(C): 36.3 (11-09-24 @ 08:35), Max: 37.2 (11-09-24 @ 05:00)  HR: 93 (11-09-24 @ 08:35) (57 - 109)  BP: 123/62 (11-09-24 @ 08:35) (104/58 - 144/62)  RR: 18 (11-09-24 @ 08:35) (16 - 18)  SpO2: 100% (11-09-24 @ 08:35) (98% - 100%)  Height (cm): 167.6 (11-08-24 @ 11:35)  Weight (kg): 61.2 (11-08-24 @ 11:35)    11-08-24 @ 07:01  -  11-09-24 @ 07:00  --------------------------------------------------------  IN: 0 mL / OUT: 1700 mL / NET: -1700 mL        Constitutional: No acute distress.  Eyes:. Conjunctivae are clear, Sclera is non-icteric.  Ears Nose and Throat: The external ears are normal appearing,  Oral mucosa is pink and moist.  Respiratory:  No signs of respiratory distress. Lung sounds are clear bilaterally.  Cardiovascular:  S1 S2, Regular rate and rhythm.  GI: Abdomen is soft, symmetric, and non-tender without distention. There are no visible lesions or scars. Bowel sounds are present and normoactive in all four quadrants. No masses, hepatomegaly, or splenomegaly are noted.   Neuro: No Tremor, No involuntary movements  Skin: No rashes, No Jaundice.          Data:                        7.7    15.52 )-----------( 381      ( 08 Nov 2024 19:51 )             23.7     Hgb Trend:  7.7  11-08-24 @ 19:51  6.9  11-08-24 @ 12:15        11-08    132[L]  |  101  |  58[H]  ----------------------------<  124[H]  4.4   |  14[L]  |  2.1[H]    Ca    8.1[L]      08 Nov 2024 12:15    TPro  6.3  /  Alb  3.3[L]  /  TBili  <0.2  /  DBili  x   /  AST  43[H]  /  ALT  25  /  AlkPhos  146[H]  11-08    Liver panel trend:  TBili <0.2   /   AST 43   /   ALT 25   /   AlkP 146   /   Tptn 6.3   /   Alb 3.3    /   DBili --      11-08              Radiology:  < from: CT Chest No Cont (11.09.24 @ 09:29) >  1.  Left lower lobe pneumonia with underlying parapneumonic effusion,   possibly partially loculated along the upper lung field.  2.  Diffusely sclerotic vertebral column, nonspecific possibly related to   renal osteodystrophy if there is a history of chronic renal disease.    < end of copied text >       84-year-old woman with history of HTN, DL, chronic anemia requiring multiple transfusions, admitted with symptomatic anemia with a hemoglobin of 5.8 for which she is being evaluated.    Patient was sent from the nursing home with symptomatic anemia and a hemoglobin of 5.8.  Repeat in the ER showed a hemoglobin of 6.8.  Patient complains of shortness of breath, dry cough, and easy fatigability for the past week.  She was thought to have a respiratory tract infection and was given antibiotics in the nursing home with no improvement.  She denies any objective evidence of bleeding and has no melena or hematochezia, no hematemesis or coffee-ground emesis.  pt has chronically dark stool due to iron tablets. No fevers or chills.  She denies unintentional weight loss or appetite changes, has no dysphagia or odynophagia, and has no other GI symptoms.  She denies any obvious blood losses from the genitourinary system.    Colonoscopy at outside facility 3 years ago which was unremarkable.     Admission, she was found to be in A-fib and have troponinemia, elevated BNP <70k, but was otherwise hemodynamically stable.      CXR showed bilateral opacities, effusions and cardiomegaly.    CT chest without contrast  1.  Left lower lobe pneumonia with underlying parapneumonic effusion,   possibly partially loculated along the upper lung field.  2.  Diffusely sclerotic vertebral column, nonspecific possibly related to   renal osteodystrophy if there is a history of chronic renal disease.      Last EGD and colonoscopy in September 2017:   9/7/2017 EGD: Hiatal hernia, gastritis, otherwise unremarkable  Unremarkable gastric and duodenal biopsies    9/7/2017 colonoscopy: 5 mm TA in sigmoid, SCAD in the sigmoid, mild diverticulosis in the sigmoid colon, external hemorrhoids, otherwise unremarkable.      PAST MEDICAL & SURGICAL HISTORY:  Arthritis  HLD (hyperlipidemia)  HTN (hypertension)    No significant past surgical history      FAMILY HISTORY: non contributory      Social History:  neg x 3     Home Medications:  Aspir 81 oral delayed release tablet: 1 tab(s) orally once a day (26 Jun 2022 11:54)  ezetimibe 10 mg oral tablet: 1 tab(s) orally once a day (26 Jun 2022 11:54)  famotidine 20 mg oral tablet: 1 tab(s) orally once a day (08 Nov 2024 18:37)  ferrous sulfate 324 mg (65 mg elemental iron) oral tablet: orally once a day (26 Jun 2022 11:54)    MEDICATIONS  (STANDING):  azithromycin   Tablet 500 milliGRAM(s) Oral daily  cefTRIAXone   IVPB 1000 milliGRAM(s) IV Intermittent every 24 hours  ezetimibe 10 milliGRAM(s) Oral daily  ferrous    sulfate 325 milliGRAM(s) Oral daily  heparin   Injectable 5000 Unit(s) SubCutaneous every 12 hours  metoprolol tartrate 12.5 milliGRAM(s) Oral three times a day  pantoprazole    Tablet 40 milliGRAM(s) Oral before breakfast    MEDICATIONS  (PRN):      Allergies  No Known Allergies      Review of Systems:   Constitutional:  No Fever, No Chills  ENT/Mouth:  No Hearing Changes,  No Difficulty Swallowing  Eyes:  No Eye Pain, No Vision Changes  Cardiovascular:  No Chest Pain, No Palpitations  Respiratory:  No Cough, No Dyspnea  Gastrointestinal:  As described in HPI  Musculoskeletal:  No Joint Swelling, No Back Pain  Skin:  No Skin Lesions, No Jaundice  Neuro:  No Syncope, No Dizziness  Heme/Lymph:  No Bruising, No Bleeding.          Physical Examination:  T(C): 36.3 (11-09-24 @ 08:35), Max: 37.2 (11-09-24 @ 05:00)  HR: 93 (11-09-24 @ 08:35) (57 - 109)  BP: 123/62 (11-09-24 @ 08:35) (104/58 - 144/62)  RR: 18 (11-09-24 @ 08:35) (16 - 18)  SpO2: 100% (11-09-24 @ 08:35) (98% - 100%)  Height (cm): 167.6 (11-08-24 @ 11:35)  Weight (kg): 61.2 (11-08-24 @ 11:35)    11-08-24 @ 07:01  -  11-09-24 @ 07:00  --------------------------------------------------------  IN: 0 mL / OUT: 1700 mL / NET: -1700 mL        Constitutional: No acute distress.  Eyes:. Conjunctivae are clear, Sclera is non-icteric.  Ears Nose and Throat: The external ears are normal appearing,  Oral mucosa is pink and moist.  Respiratory:  No signs of respiratory distress. Lung sounds are clear bilaterally.  Cardiovascular:  S1 S2, Regular rate and rhythm.  GI: Abdomen is soft, symmetric, and non-tender without distention. There are no visible lesions or scars. Bowel sounds are present and normoactive in all four quadrants. No masses, hepatomegaly, or splenomegaly are noted.   Neuro: No Tremor, No involuntary movements  Skin: No rashes, No Jaundice.          Data:                        7.7    15.52 )-----------( 381      ( 08 Nov 2024 19:51 )             23.7     Hgb Trend:  7.7  11-08-24 @ 19:51  6.9  11-08-24 @ 12:15        11-08    132[L]  |  101  |  58[H]  ----------------------------<  124[H]  4.4   |  14[L]  |  2.1[H]    Ca    8.1[L]      08 Nov 2024 12:15    TPro  6.3  /  Alb  3.3[L]  /  TBili  <0.2  /  DBili  x   /  AST  43[H]  /  ALT  25  /  AlkPhos  146[H]  11-08    Liver panel trend:  TBili <0.2   /   AST 43   /   ALT 25   /   AlkP 146   /   Tptn 6.3   /   Alb 3.3    /   DBili --      11-08              Radiology:  < from: CT Chest No Cont (11.09.24 @ 09:29) >  1.  Left lower lobe pneumonia with underlying parapneumonic effusion,   possibly partially loculated along the upper lung field.  2.  Diffusely sclerotic vertebral column, nonspecific possibly related to   renal osteodystrophy if there is a history of chronic renal disease.    < end of copied text >

## 2024-11-09 NOTE — CONSULT NOTE ADULT - ASSESSMENT
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  Acute issues such as heart failure.  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  Acute issues such as heart failure.  In the pneumonia in the left lower lobe was just seen on CT chest  -Will follow-up repeat anemia panel  -I think she would benefit from a bone marrow biopsy  As well to determine MDS but patient and her daughter declined and she already is being followed by outpatient hematologist in the community from [facility]  -We could restart her Retacrit at 10,000 units weekly, she is due for it on Wednesdays  -I would hold off any IV iron given an acute infection the iron replacement can be done as outpatient  -If there is any other questions please contact her outpatient hematologist they have privileges here as well  -No objections to DVT prophylaxis  -I would stop oral iron given that she is going to be receiving IV iron as outpatient  -Can transfuse to keep hemoglobin greater than 7 to 8 g/dL depending on her symptoms

## 2024-11-09 NOTE — PHYSICAL THERAPY INITIAL EVALUATION ADULT - LEVEL OF INDEPENDENCE: STAND/SIT, REHAB EVAL
maximum assist (25% patients effort) 2/2 severe weakness of B LE and poor postural control/unable to perform

## 2024-11-09 NOTE — CONSULT NOTE ADULT - SUBJECTIVE AND OBJECTIVE BOX
Patient is a 84y old  Female who presents with a chief complaint of symptomatic anemia, new onset afib (09 Nov 2024 10:25)      HPI:  84F with phmx htn hld chronic anemia presents after she was found to have low hemoglobin at [facility]. Upon presentation tot he ER, patient was found to have new onset afib. Patient reports feeling short of breath for the last few days and was empirically started on antibiotics for the last three days (not sure which abx). Hgb outpatient was 5.9, repeat upon ER presentation was 6.9. Per daughter, patient has a long hisotry of anemia and has begun requiring transfusions this year. She has a colonoscopy and endoscopy workup which was reportedly negative. Patient reports some cough and shortness of breath. She denies chest pain, nausea, vomiting, or dysuria.  (08 Nov 2024 18:08)       In the ER she received a unit  of PRBC B12, iron studies and folate, with SPEP were sent by hospitalist. Differential for myeloma and MDS is a consideration.    Hemoglobin up to 9.3 posttransfusion.  The anemia is on the microcytic side she also has leukocytosis with left shift and neutrophil predominant creatinine is 1.9 was 2.1 on admission was 1.5 in October of this year.    In August of 2017 hemoglobin was 7.7.  It was seem to be mostly microcytic but in 2021 hemoglobin was 822 baseline creatinine is around 1.2-1.4    Ferritin was 871 in 2022.  Recent B12 was in June 2024 which was normal.  Serum proBNP is greater than    In 2021 she had a myeloma panel her kappa was 4.56 lambda was 1.83 patient was 2.49.  SPEP was normal    Erythropoietin was 50.9 in 2021    She has been followed by  Although any who last saw her in 2021 for the anemia that was initially diagnosed in 2017 was suspected to be GI bleed.     A bone marrow biopsy it was offered in 2021 but she refused and Procrit was recommended    ROS:  Negative except for:    PAST MEDICAL & SURGICAL HISTORY:  Arthritis      HLD (hyperlipidemia)      HTN (hypertension)      No significant past surgical history          SOCIAL HISTORY:    FAMILY HISTORY:      MEDICATIONS  (STANDING):  azithromycin   Tablet 500 milliGRAM(s) Oral daily  cefTRIAXone   IVPB 1000 milliGRAM(s) IV Intermittent every 24 hours  ezetimibe 10 milliGRAM(s) Oral daily  ferrous    sulfate 325 milliGRAM(s) Oral daily  heparin   Injectable 5000 Unit(s) SubCutaneous every 12 hours  metoprolol tartrate 12.5 milliGRAM(s) Oral three times a day  pantoprazole    Tablet 40 milliGRAM(s) Oral before breakfast    MEDICATIONS  (PRN):      Allergies    No Known Allergies    Intolerances        Vital Signs Last 24 Hrs  T(C): 36.3 (09 Nov 2024 08:35), Max: 37.2 (09 Nov 2024 05:00)  T(F): 97.4 (09 Nov 2024 08:35), Max: 98.9 (09 Nov 2024 05:00)  HR: 93 (09 Nov 2024 08:35) (83 - 109)  BP: 123/62 (09 Nov 2024 08:35) (104/58 - 144/62)  BP(mean): 75 (08 Nov 2024 14:42) (75 - 75)  RR: 18 (09 Nov 2024 08:35) (16 - 18)  SpO2: 100% (09 Nov 2024 08:35) (98% - 100%)    Parameters below as of 09 Nov 2024 08:35  Patient On (Oxygen Delivery Method): room air              PHYSICAL EXAM  General: adult in NAD  HEENT: clear oropharynx, anicteric sclera, pink conjunctiva  Neck: supple  CV: normal S1/S2 with no murmur rubs or gallops  Lungs: positive air movement b/l ant lungs,clear to auscultation, no wheezes, no rales  Abdomen: soft non-tender non-distended, no hepatosplenomegaly  Ext: no clubbing cyanosis or edema  Skin: no rashes and no petechiae  Neuro: alert and oriented X 4, no focal deficits      LABS:                          9.3    15.27 )-----------( 378      ( 09 Nov 2024 04:30 )             27.6         Mean Cell Volume : 80.7 fL  Mean Cell Hemoglobin : 27.2 pg  Mean Cell Hemoglobin Concentration : 33.7 g/dL  Auto Neutrophil # : x  Auto Lymphocyte # : x  Auto Monocyte # : x  Auto Eosinophil # : x  Auto Basophil # : x  Auto Neutrophil % : x  Auto Lymphocyte % : x  Auto Monocyte % : x  Auto Eosinophil % : x  Auto Basophil % : x      Serial CBC's  11-09 @ 04:30  Hct-27.6 / Hgb-9.3 / Plat-378 / RBC-3.42 / WBC-15.27  Serial CBC's  11-08 @ 19:51  Hct-23.7 / Hgb-7.7 / Plat-381 / RBC-2.87 / WBC-15.52  Serial CBC's  11-08 @ 12:15  Hct-21.0 / Hgb-6.9 / Plat-434 / RBC-2.61 / WBC-18.51      11-08    132[L]  |  101  |  58[H]  ----------------------------<  124[H]  4.4   |  14[L]  |  2.1[H]    Ca    8.1[L]      08 Nov 2024 12:15    TPro  6.3  /  Alb  3.3[L]  /  TBili  <0.2  /  DBili  x   /  AST  43[H]  /  ALT  25  /  AlkPhos  146[H]  11-08                      BLOOD SMEAR INTERPRETATION:       RADIOLOGY & ADDITIONAL STUDIES:     Patient is a 84y old  Female who presents with a chief complaint of symptomatic anemia, new onset afib (09 Nov 2024 10:25)      HPI:  84F with phmx htn hld chronic anemia presents after she was found to have low hemoglobin at [facility]. Upon presentation tot he ER, patient was found to have new onset afib. Patient reports feeling short of breath for the last few days and was empirically started on antibiotics for the last three days (not sure which abx). Hgb outpatient was 5.9, repeat upon ER presentation was 6.9. Per daughter, patient has a long hisotry of anemia and has begun requiring transfusions this year. She has a colonoscopy and endoscopy workup which was reportedly negative. Patient reports some cough and shortness of breath. She denies chest pain, nausea, vomiting, or dysuria.  (08 Nov 2024 18:08)       In the ER she received a unit  of PRBC B12, iron studies and folate, with SPEP were sent by hospitalist. Differential for myeloma and MDS is a consideration.    Hemoglobin up to 9.3 posttransfusion.  The anemia is on the microcytic side she also has leukocytosis with left shift and neutrophil predominant creatinine is 1.9 was 2.1 on admission was 1.5 in October of this year.    In August of 2017 hemoglobin was 7.7.  It was seem to be mostly microcytic but in 2021 hemoglobin was 822 baseline creatinine is around 1.2-1.4    Ferritin was 871 in 2022.  Recent B12 was in June 2024 which was normal.  Serum proBNP is greater than    In 2021 she had a myeloma panel her kappa was 4.56 lambda was 1.83 patient was 2.49.  SPEP was normal    Erythropoietin was 50.9 in 2021    She  Was seen by Dr. Torrez last saw her in 2021 for the anemia that was initially diagnosed in 2017 was suspected to be GI bleed.     A bone marrow biopsy it was offered in 2021 but she refused and Procrit was recommended    Her daughter was at bedside as well she told me recently she was seen by hematologist in the community and was started on Procrit 10,000 units weekly as well as sodium ferric and received first dose about a week ago.    The patient lives in an car because she does not walk well due to severe arthritis her range of motion is limited    ROS:  Negative except for: Patient tells me she is fatigued but denies shortness of breath swelling in the legs have improved this morning no fevers, the rest of 12 point review of system was negative    PAST MEDICAL & SURGICAL HISTORY:  Arthritis      HLD (hyperlipidemia)      HTN (hypertension)      No significant past surgical history          SOCIAL HISTORY:   She was a former smoker no alcohol use lives in a nursing home  FAMILY HISTORY: Denies any cancer       MEDICATIONS  (STANDING):  azithromycin   Tablet 500 milliGRAM(s) Oral daily  cefTRIAXone   IVPB 1000 milliGRAM(s) IV Intermittent every 24 hours  ezetimibe 10 milliGRAM(s) Oral daily  ferrous    sulfate 325 milliGRAM(s) Oral daily  heparin   Injectable 5000 Unit(s) SubCutaneous every 12 hours  metoprolol tartrate 12.5 milliGRAM(s) Oral three times a day  pantoprazole    Tablet 40 milliGRAM(s) Oral before breakfast    MEDICATIONS  (PRN):      Allergies    No Known Allergies    Intolerances        Vital Signs Last 24 Hrs  T(C): 36.3 (09 Nov 2024 08:35), Max: 37.2 (09 Nov 2024 05:00)  T(F): 97.4 (09 Nov 2024 08:35), Max: 98.9 (09 Nov 2024 05:00)  HR: 93 (09 Nov 2024 08:35) (83 - 109)  BP: 123/62 (09 Nov 2024 08:35) (104/58 - 144/62)  BP(mean): 75 (08 Nov 2024 14:42) (75 - 75)  RR: 18 (09 Nov 2024 08:35) (16 - 18)  SpO2: 100% (09 Nov 2024 08:35) (98% - 100%)    Parameters below as of 09 Nov 2024 08:35  Patient On (Oxygen Delivery Method): room air              PHYSICAL EXAM  General: adult in NAD  HEENT: clear oropharynx, anicteric sclera, pink conjunctiva  Neck: supple  CV: normal S1/S2 with no murmur rubs or gallops  Lungs: positive air movement b/l ant lungs,clear to auscultation, no wheezes, no rales  Abdomen: soft non-tender non-distended, no hepatosplenomegaly  Ext: no clubbing cyanosis or edema  Skin: no rashes and no petechiae  Neuro: alert and oriented X 4, no focal deficits      LABS:                          9.3    15.27 )-----------( 378      ( 09 Nov 2024 04:30 )             27.6         Mean Cell Volume : 80.7 fL  Mean Cell Hemoglobin : 27.2 pg  Mean Cell Hemoglobin Concentration : 33.7 g/dL  Auto Neutrophil # : x  Auto Lymphocyte # : x  Auto Monocyte # : x  Auto Eosinophil # : x  Auto Basophil # : x  Auto Neutrophil % : x  Auto Lymphocyte % : x  Auto Monocyte % : x  Auto Eosinophil % : x  Auto Basophil % : x      Serial CBC's  11-09 @ 04:30  Hct-27.6 / Hgb-9.3 / Plat-378 / RBC-3.42 / WBC-15.27  Serial CBC's  11-08 @ 19:51  Hct-23.7 / Hgb-7.7 / Plat-381 / RBC-2.87 / WBC-15.52  Serial CBC's  11-08 @ 12:15  Hct-21.0 / Hgb-6.9 / Plat-434 / RBC-2.61 / WBC-18.51      11-08    132[L]  |  101  |  58[H]  ----------------------------<  124[H]  4.4   |  14[L]  |  2.1[H]    Ca    8.1[L]      08 Nov 2024 12:15    TPro  6.3  /  Alb  3.3[L]  /  TBili  <0.2  /  DBili  x   /  AST  43[H]  /  ALT  25  /  AlkPhos  146[H]  11-08                    RADIOLOGY & ADDITIONAL STUDIES:

## 2024-11-09 NOTE — CONSULT NOTE ADULT - ASSESSMENT
ASSESSMENT  This is a 84 year old female with PMH of HTN, HLD, chronic anemia presents after she was found to have low hemoglobin at [facility].    IMPRESSION  #Leukocytosis- Likely reactive from anemia   #CT showing left lower lobe consolidation with underlying parapneumonic effusion. Possibly partially loculated.   Clinically no bacterial pneumonia. Possibility of chronic aspiration vs underlying undiagnosed malignancy?  #Acute on chronic anemia-Oncology evaluation pending-Perhaps MDS?   #Type 2 NSTEMII, possible cardiomyopathy.   Getting iron infusion? 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    RECOMMENDATIONS  -Follow up with RSV/Flu/Covid- Patient on Room air and no respiratory distress.   -Consider obtaining TTE.  -Sputum cultures if possible.  -Speech and swallow evaluation.  -Consult Pulmonary for evaluation of thoracentesis. Can send pH, Cell count, LDH, Protein, Bacterial cultures, Cytopathology.  -D/C Ceftriaxone/Azithromycin  -IV Unasyn 3 gram q 12 hours.  -Off loading to prevent pressure sores and preventive measures to avoid aspiration  -Guarded prognosis. GOC.     If any questions, please send a message or call on [employer]  Please continue to update ID with any pertinent new laboratory or radiographic findings.    Nabeel Lopez M.D  Infectious Diseases Attending/   [employer] at hospitals/[facility]

## 2024-11-09 NOTE — PROGRESS NOTE ADULT - SUBJECTIVE AND OBJECTIVE BOX
LINDA UPTON  [facility] [address] [address] ([facility] 3I)            Patient was evaluated and examined  by bedside.                REVIEW OF SYSTEMS:  please see pertinent positives mentioned above, all other 12 ROS negative      T(C): , Max: 37.2 (11-09-24 @ 05:00)  HR: 96 (11-09-24 @ 13:37)  BP: 91/50 (11-09-24 @ 13:37)  RR: 16 (11-09-24 @ 13:37)  SpO2: 96% (11-09-24 @ 13:37)  CAPILLARY BLOOD GLUCOSE          PHYSICAL EXAM:  General: NAD  HEETN: NCAT  Lungs: scattered crackles throughout lung fields  Heart: irregular  Abdomen: Non-distended  Extremities: No edema      LAB  CBC  Date: 11-09-24 @ 04:30  Mean cell Qghqaytdeh25.2  Mean cell Hemoglobin Conc33.7  Mean cell Volum 80.7  Platelet count-Automate 378  RBC Count 3.42  Red Cell Distrib Width15.9  WBC Count15.27  % Albumin, Urine--  Hematocrit 27.6  Hemoglobin 9.3  CBC  Date: 11-08-24 @ 19:51  Mean cell Mdkuxgwgaf00.8  Mean cell Hemoglobin Conc32.5  Mean cell Volum 82.6  Platelet count-Automate 381  RBC Count 2.87  Red Cell Distrib Width16.9  WBC Count15.52  % Albumin, Urine--  Hematocrit 23.7  Hemoglobin 7.7  CBC  Date: 11-08-24 @ 12:15  Mean cell Nqqfzjgixu02.4  Mean cell Hemoglobin Conc32.9  Mean cell Volum 80.5  Platelet count-Automate 434  RBC Count 2.61  Red Cell Distrib Width17.4  WBC Count18.51  % Albumin, Urine--  Hematocrit 21.0  Hemoglobin 6.9    [facility]  11-09-24 @ 04:30  Blood Gas Arterial-Calcium,Ionized--  Blood Urea Nitrogen, Serum 60 mg/dL[H] [10 - 20]  Carbon Dioxide, Serum12 mmol/L[L] [17 - 32]  Chloride, Ujgzp575 mmol/L [98 - 110]  Creatinie, Serum1.9 mg/dL[H] [0.7 - 1.5]  Glucose, Serum86 mg/dL [70 - 99]  Potassium, Serum4.2 mmol/L [3.5 - 5.0]  Sodium, Serum 136 mmol/L [135 - 146]  [facility]  11-08-24 @ 12:15  Blood Gas Arterial-Calcium,Ionized--  Blood Urea Nitrogen, Serum 58 mg/dL[H] [10 - 20]  Carbon Dioxide, Serum14 mmol/L[L] [17 - 32]  Chloride, Vdaub504 mmol/L [98 - 110]  Creatinie, Serum2.1 mg/dL[H] [0.7 - 1.5]  Glucose, Ajpqf925 mg/dL[H] [70 - 99]  Potassium, Serum4.4 mmol/L [3.5 - 5.0]  Sodium, Serum 132 mmol/L[L] [135 - 146]              Microbiology:      RADIOLOGY & ADDITIONAL TESTS:        Medications:  ampicillin/sulbactam  IVPB 3 Gram(s) IV Intermittent two times a day  ezetimibe 10 milliGRAM(s) Oral daily  ferrous    sulfate 325 milliGRAM(s) Oral daily  lactated ringers Bolus 250 milliLiter(s) IV Bolus once  metoprolol tartrate 12.5 milliGRAM(s) Oral two times a day  pantoprazole    Tablet 40 milliGRAM(s) Oral before breakfast        Assessment and Plan:  84F with PMH notable for hypetension, hyperlipidemia,  chronic anemia, frequent ED visits for anemia requiring transfusions, presenting from nursing home after found to have Hb of 5.8, also found to have CAP and new onset atrial fibrillation    #Community acquired pneumonia  #Complex parapneumonic effusion  Minimal symptoms, with dry cough for 1 week, labs notable for leukocytosis up to 18k, patient remarkably afebrile, suspect due to immunosenescence, on RA currently. CT with LLL opacity with associated loculated parapneumonic effusion  -Status post ceftriaxone/azithromycin (11/8)-->switch to Unasyn 3gr q12 hours as per ID recs  -Follow BCx, sputum Cx, COVID-19/Flu/RSV  -Pulm consult for diagnostic thora    #Symptomatic anemia  Reports that she has anemia for three years now and has undergone extensive workup including C-scope 3 years ago which was unremarkable. Has chronically dark stool due to iron tablets. Denies any bleeding. It appears that her Hb has downtrended over the years from 10-->9-->7-8 currently. DDx includes occult chronic GI bleeding leading to EVER, however at that age, MM and MDS also need to be considered  -Follow iron studies, B12, folate  -Follow FOBT, negative samples so far  -Follow SPEP/immunofixation  -Heme/onc consulted apprecite recs  -GI consulted appreciate recs    #Atrial fibrillation, new onset  -Likely in the setting of anemia and infection  -Cont low dose metoprolol 12.5mg BID  -Likely associated CHF, received two doses of Lasix with PRBC, will hold off on further Lasix as CT without clear evidence of pulmonary edema, mainly PNA noted and patient also with soft BP   -TTE ordered and pending  -No indication for AC as per cards currently  -Cards consulted appreciate recs    #NSTEMI type 2 secondary to demand ischemia  -Trops peaked and downtrended, stop checking    Code: DNR/DNI ok for NIV  PPx: heparin, hold for thora  Dispo: acute

## 2024-11-09 NOTE — PHYSICAL THERAPY INITIAL EVALUATION ADULT - CRITERIA FOR SKILLED THERAPEUTIC INTERVENTIONS
Based on the findings pt is at base line functional level for amb and transfers, demonstrated good balance with ADLs and will be d/c from PT based on the assessment pt is at base line for transfers and bed mobs, pt is dependent for bed to WC transfer at baseline, pt is not a good candidate for skilled bed side PT, pt will be d/c from PT

## 2024-11-09 NOTE — PHYSICAL THERAPY INITIAL EVALUATION ADULT - LIVES WITH, PROFILE
Patient seen in urgent care today and diagnosed with COVID-19, influenza and strep throat.  He had a co-worker who was sick and came to work and his son also recently had strep.  He was prescribed Xofluza, Paxlovid and amoxicillin.  He understands that he must hold his Crestor.  He is also requesting a refill on his metformin at his new pharmacy-[facility].  The previous refill was sent to [facility].  Additionally he stubbed his toe under the drain in his shower and had significant trauma to the nail bed with resulted in a partial traumatic avulsion of his toenail.    
nursing home

## 2024-11-09 NOTE — PHYSICAL THERAPY INITIAL EVALUATION ADULT - BED MOBILITY LIMITATIONS, REHAB EVAL
decreased ability to use arms for pushing/pulling/decreased ability to use legs for bridging/pushing/impaired ability to control trunk for mobility decreased ability to use legs for bridging/pushing/impaired ability to control trunk for mobility

## 2024-11-09 NOTE — PHYSICAL THERAPY INITIAL EVALUATION ADULT - ADDITIONAL COMMENTS
Pt lives in a nursing home. There are no stairs to enter. Pt states that she has not ambulated in a year. Pt lives in a nursing home. There are no stairs to enter. Pt states that she has not ambulated in a year. Pt is dependent for transfers and is using WC for amb

## 2024-11-09 NOTE — PHYSICAL THERAPY INITIAL EVALUATION ADULT - IMPAIRED TRANSFERS: SIT/STAND, REHAB EVAL
Medication:   Name from pharmacy: LISINOPRIL 20 MG TABLET    Will file in chart as: lisinopril (ZESTRIL) 20 MG tablet   Sig: TAKE 1 TABLET BY MOUTH EVERY DAY   Disp:  30 tablet    Refills: 0   Start: 11/10/2023   Last ordered: 11 months ago (12/12/2022) by Silvana Mazariegos MD   Last refill: 8/14/2023   ACE Inhibitor Refill Protocol - 12 Month Protocol Failed 11/10/2023 12:11 AM   Protocol Details  Last BP was under 140/90 or if patient has diabetes, CAD, or PVD, BP under 130/80 in past year -- IF CRITERIA FAILED REFER TO PROTOCOL DETAILS      To be filled at: [facility]/pharmacy #[id number] - [place], [place] - [facility] [address]   Last office visit date: 01/11/2023  Medication Refill Protocol Failed.  Failed criteria: elevated BP. Sent to clinician to review.      impaired balance/impaired postural control/decreased strength

## 2024-11-09 NOTE — CONSULT NOTE ADULT - TIME BILLING
On this date of service, level of risk to patient is considered: Moderate.    I have personally seen and examined this patient.    I have reviewed all pertinent clinical information and reviewed all relevant imaging and diagnostic studies personally.   I discussed recommendations with the primary team. I discussed recommendations with the primary team.

## 2024-11-09 NOTE — CONSULT NOTE ADULT - SUBJECTIVE AND OBJECTIVE BOX
LINDA UPTON  84y, Female  Allergies    No Known Allergies    Intolerances    LOS  1d    HPI  HPI:  84F with phmx htn hld chronic anemia presents after she was found to have low hemoglobin at [facility]. Upon presentation tot he ER, patient was found to have new onset afib. Patient reports feeling short of breath for the last few days and was empirically started on antibiotics for the last three days (not sure which abx). Hgb outpatient was 5.9, repeat upon ER presentation was 6.9. Per daughter, patient has a long hisotry of anemia and has begun requiring transfusions this year. She has a colonoscopy and endoscopy workup which was reportedly negative. Patient reports some cough and shortness of breath. She denies chest pain, nausea, vomiting, or dysuria.  (08 Nov 2024 18:08)      INFECTIOUS DISEASE HISTORY:  Hospital course-  ID consulted for antimicrobial recommendations.     Prior hospital charts reviewed [Yes]  Primary team notes reviewed [Yes]  Other consultant notes reviewed [Yes]    REVIEW OF SYSTEMS:  CONSTITUTIONAL: No fever or chills  HEENT: No sore throat  RESPIRATORY: No cough, no shortness of breath  CARDIOVASCULAR: No chest pain or palpitations  GASTROINTESTINAL: No abdominal or epigastric pain  GENITOURINARY: No dysuria  NEUROLOGICAL: No headache/dizziness  MSK: No joint pain, erythema, or swelling; no back pain  SKIN: No itching, rashes  All other ROS negative except noted above    PAST MEDICAL & SURGICAL HISTORY:  Arthritis      HLD (hyperlipidemia)      HTN (hypertension)      No significant past surgical history    SOCIAL HISTORY:  - No recent travel    FAMILY HISTORY: No pertinent PMH for first degree relatives.     ANTIMICROBIALS:  azithromycin   Tablet 500 daily  cefTRIAXone   IVPB 1000 every 24 hours      ANTIMICROBIALS (past 90 days):  MEDICATIONS  (STANDING):  azithromycin   Tablet   500 milliGRAM(s) Oral (11-09-24 @ 12:26)   500 milliGRAM(s) Oral (11-08-24 @ 19:17)    cefTRIAXone   IVPB   100 mL/Hr IV Intermittent (11-08-24 @ 20:31)        OTHER MEDS:   MEDICATIONS  (STANDING):  ezetimibe 10 daily  heparin   Injectable 5000 every 12 hours  metoprolol tartrate 12.5 three times a day  pantoprazole    Tablet 40 before breakfast      VITALS:  Vital Signs Last 24 Hrs  T(F): 97.4 (11-09-24 @ 08:35), Max: 98.9 (11-09-24 @ 05:00)    Vital Signs Last 24 Hrs  HR: 93 (11-09-24 @ 08:35) (83 - 109)  BP: 123/62 (11-09-24 @ 08:35) (104/58 - 144/62)  RR: 18 (11-09-24 @ 08:35)  SpO2: 100% (11-09-24 @ 08:35) (98% - 100%)  Wt(kg): --    EXAM:  GENERAL: NAD, frail looking.   HEAD: No head lesions  NECK: Supple  CHEST/LUNG: Shallow breath sounds.   HEART: S1 S2  ABDOMEN: Soft, nontender  EXTREMITIES: No clubbing. + PICC line in the right arm.   NERVOUS SYSTEM: Alert and oriented to person  MSK: No joint erythema, swelling or pain  SKIN: No rashes or lesions, no superficial thrombophlebitis    Labs:                        9.3    15.27 )-----------( 378      ( 09 Nov 2024 04:30 )             27.6     11-09    136  |  107  |  60[H]  ----------------------------<  86  4.2   |  12[L]  |  1.9[H]    Ca    8.4      09 Nov 2024 04:30  Phos  4.2     11-09  Mg     1.9     11-09    TPro  6.4  /  Alb  3.1[L]  /  TBili  0.3  /  DBili  x   /  AST  30  /  ALT  20  /  AlkPhos  140[H]  11-09      WBC Trend:  WBC Count: 15.27 (11-09-24 @ 04:30)  WBC Count: 15.52 (11-08-24 @ 19:51)  WBC Count: 18.51 (11-08-24 @ 12:15)      Auto Neutrophil #: 16.24 K/uL (11-08-24 @ 12:15)  Auto Neutrophil #: 6.39 K/uL (10-24-24 @ 12:55)  Auto Neutrophil #: 3.95 K/uL (10-17-24 @ 18:00)  Auto Neutrophil #: 3.94 K/uL (08-08-24 @ 17:12)  Auto Neutrophil #: 5.20 K/uL (07-10-24 @ 15:15)      Creatine Trend:  Creatinine: 1.9 (11-09)  Creatinine: 2.1 (11-08)      Liver Biochemical Testing Trend:  Alanine Aminotransferase (ALT/SGPT): 20 (11-09)  Alanine Aminotransferase (ALT/SGPT): 25 (11-08)  Alanine Aminotransferase (ALT/SGPT): 10 (10-24)  Alanine Aminotransferase (ALT/SGPT): TNP (10-17)  Alanine Aminotransferase (ALT/SGPT): 7 (08-08)  Aspartate Aminotransferase (AST/SGOT): 30 (11-09-24 @ 04:30)  Aspartate Aminotransferase (AST/SGOT): 43 (11-08-24 @ 12:15)  Aspartate Aminotransferase (AST/SGOT): 18 (10-24-24 @ 12:55)  Aspartate Aminotransferase (AST/SGOT): TNP (10-17-24 @ 18:00)  Aspartate Aminotransferase (AST/SGOT): 16 (08-08-24 @ 17:12)  Bilirubin Total: 0.3 (11-09)  Bilirubin Total: <0.2 (11-08)  Bilirubin Total: 0.3 (10-24)  Bilirubin Total: TNP (10-17)  Bilirubin Total: 0.2 (08-08)      Trend LDH      Auto Eosinophil %: 0.1 % (11-08-24 @ 12:15)      Urinalysis Basic - ( 09 Nov 2024 04:30 )    Color: x / Appearance: x / SG: x / pH: x  Gluc: 86 mg/dL / Ketone: x  / Bili: x / Urobili: x   Blood: x / Protein: x / Nitrite: x   Leuk Esterase: x / RBC: x / WBC x   Sq Epi: x / Non Sq Epi: x / Bacteria: x        MICROBIOLOGY:    Female  Troponin T, High Sensitivity Result: 825 (11-08)  Troponin T, High Sensitivity Result: 988 (11-08)  Troponin T, High Sensitivity Result: 965 (11-08)    INFLAMMATORY MARKERS      RADIOLOGY & ADDITIONAL TESTS:  I have personally reviewed the imagings.  CXR      CT  CT Chest No Cont:   ACC: [id number] EXAM:  CT CHEST   ORDERED BY: STEVEN STOVER     PROCEDURE DATE:  11/09/2024          INTERPRETATION:  CLINICAL INFORMATION: Evaluation for pneumonia.    COMPARISON: None.    CONTRAST/COMPLICATIONS:  IV Contrast: NONE  Oral Contrast: NONE  Complications: None reported at time of study completion    PROCEDURE:  CT of the Chest was performed.  Sagittal and coronal reformats were performed.    FINDINGS:    LUNGS/AIRWAYS/PLEURA: Trachea is clear. Subpleural reticulations possibly   related to scarring or fibrotic changes. Right lower lobe dependent   subsegmental atelectasis. Left lower lobe consolidation with air   bronchograms compatible with pneumonia. Underlying parapneumonic   effusion, possibly partially loculated.  VESSELS: Aortic and coronary artery and branch vessel atherosclerosis.  HEART: Cardiomegaly. Mitral annulus calcifications. Aortic valve   calcifications. Coronary artery calcifications. Trace fluid in the   superior pericardial recess  MEDIASTINUM AND DIEGO: No lymphadenopathy.  CHEST WALL AND LOWER NECK: Within normal limits.  VISUALIZED UPPER ABDOMEN: No focal abnormality.  BONES: Diffusely sclerotic bones, nonspecific can be seen with renal   osteodystrophy. Degenerative changes along the vertebral column.   Bilateral shoulder osteoarthritic changes more pronounced on the right.    IMPRESSION:  1.  Left lower lobe pneumonia with underlying parapneumonic effusion,   possibly partially loculated along the upper lung field.  2.  Diffusely sclerotic vertebral column, nonspecific possibly related to   renal osteodystrophy if there is a history of chronic renal disease.  --- End of Report ---  STORMY SIBLEY MD; Attending Radiologist  This document has been electronically signed. Nov 9 2024  9:39AM (11-09-24 @ 09:29)      CARDIOLOGY TESTING  12 Lead ECG:   Ventricular Rate 95 BPM    Atrial Rate 122 BPM    QRS Duration 148 ms    Q-T Interval 424 ms    QTC Calculation(Bazett) 532 ms    R Axis 2 degrees    T Axis 188 degrees    Diagnosis Line Atrial fibrillation with premature ventricular or aberrantly conducted  complexes  Left bundle branch block  Abnormal ECG    Confirmed by CHANTELLE KAPADIA MD ([id number]) on 11/9/2024 11:13:11 AM (11-09-24 @ 08:02)  12 Lead ECG:   Ventricular Rate 93 BPM    Atrial Rate 102 BPM    QRS Duration 144 ms    Q-T Interval 412 ms    QTC Calculation(Bazett) 512 ms    R Axis 21 degrees    T Axis 190 degrees    Diagnosis Line Atrial fibrillation  Left bundle branch block  Abnormal ECG    Confirmed by CHANTELLE KAPADIA MD [id number]) on 11/9/2024 6:15:09 AM (11-08-24 @ 19:25)

## 2024-11-09 NOTE — PHYSICAL THERAPY INITIAL EVALUATION ADULT - GENERAL OBSERVATIONS, REHAB EVAL
PT 14:15 - 14:40. Pt was seen for PT IE. chart thoroughly reviewed. RN Donny was aware. Pt agreed to be seen. Pt was found laying in bed in semi dempsey position in no apparent distress, +hep lock +primafit +call bell within reach. Family at bedside PT 14:15 - 14:40. Pt was seen for PT IE. chart thoroughly reviewed. RN Donny was aware. Pt agreed to be seen. Pt was found laying in bed in semi dempsey position in no apparent distress, +hep lock +telemonitoring +primafit +call bell within reach. Family at bedside

## 2024-11-09 NOTE — PHYSICAL THERAPY INITIAL EVALUATION ADULT - NAME OF DISCHARGE PLANNER
Left arm; care coordinator will be notified when available Case coordinator will be notified when available

## 2024-11-10 LAB
ALBUMIN SERPL ELPH-MCNC: 3 G/DL — LOW (ref 3.5–5.2)
ALP SERPL-CCNC: 137 U/L — HIGH (ref 30–115)
ALT FLD-CCNC: 20 U/L — SIGNIFICANT CHANGE UP (ref 0–41)
ANION GAP SERPL CALC-SCNC: 18 MMOL/L — HIGH (ref 7–14)
APTT BLD: 27.2 SEC — SIGNIFICANT CHANGE UP (ref 27–39.2)
AST SERPL-CCNC: 28 U/L — SIGNIFICANT CHANGE UP (ref 0–41)
BILIRUB SERPL-MCNC: 0.3 MG/DL — SIGNIFICANT CHANGE UP (ref 0.2–1.2)
BUN SERPL-MCNC: 62 MG/DL — CRITICAL HIGH (ref 10–20)
CALCIUM SERPL-MCNC: 8.4 MG/DL — SIGNIFICANT CHANGE UP (ref 8.4–10.5)
CHLORIDE SERPL-SCNC: 105 MMOL/L — SIGNIFICANT CHANGE UP (ref 98–110)
CO2 SERPL-SCNC: 15 MMOL/L — LOW (ref 17–32)
CREAT SERPL-MCNC: 2.1 MG/DL — HIGH (ref 0.7–1.5)
CULTURE RESULTS: SIGNIFICANT CHANGE UP
EGFR: 23 ML/MIN/1.73M2 — LOW
FERRITIN SERPL-MCNC: 1356 NG/ML — HIGH (ref 13–330)
FOLATE SERPL-MCNC: >20 NG/ML — SIGNIFICANT CHANGE UP
GLUCOSE SERPL-MCNC: 82 MG/DL — SIGNIFICANT CHANGE UP (ref 70–99)
HCT VFR BLD CALC: 31.5 % — LOW (ref 37–47)
HGB BLD-MCNC: 10.4 G/DL — LOW (ref 12–16)
INR BLD: 1.02 RATIO — SIGNIFICANT CHANGE UP (ref 0.65–1.3)
IRON SATN MFR SERPL: 14 % — SIGNIFICANT CHANGE UP (ref 14–50)
IRON SATN MFR SERPL: 26 UG/DL — LOW (ref 30–160)
MAGNESIUM SERPL-MCNC: 1.9 MG/DL — SIGNIFICANT CHANGE UP (ref 1.8–2.4)
MCHC RBC-ENTMCNC: 27.3 PG — SIGNIFICANT CHANGE UP (ref 27–31)
MCHC RBC-ENTMCNC: 33 G/DL — SIGNIFICANT CHANGE UP (ref 32–37)
MCV RBC AUTO: 82.7 FL — SIGNIFICANT CHANGE UP (ref 81–99)
NRBC # BLD: 0 /100 WBCS — SIGNIFICANT CHANGE UP (ref 0–0)
PLATELET # BLD AUTO: 475 K/UL — HIGH (ref 130–400)
PMV BLD: 10.1 FL — SIGNIFICANT CHANGE UP (ref 7.4–10.4)
POTASSIUM SERPL-MCNC: 4.1 MMOL/L — SIGNIFICANT CHANGE UP (ref 3.5–5)
POTASSIUM SERPL-SCNC: 4.1 MMOL/L — SIGNIFICANT CHANGE UP (ref 3.5–5)
PROT SERPL-MCNC: 6.3 G/DL — SIGNIFICANT CHANGE UP (ref 6–8)
PROT SERPL-MCNC: 6.3 G/DL — SIGNIFICANT CHANGE UP (ref 6–8.3)
PROTHROM AB SERPL-ACNC: 12.1 SEC — SIGNIFICANT CHANGE UP (ref 9.95–12.87)
RBC # BLD: 3.81 M/UL — LOW (ref 4.2–5.4)
RBC # FLD: 16.8 % — HIGH (ref 11.5–14.5)
SODIUM SERPL-SCNC: 138 MMOL/L — SIGNIFICANT CHANGE UP (ref 135–146)
SPECIMEN SOURCE: SIGNIFICANT CHANGE UP
TIBC SERPL-MCNC: 184 UG/DL — LOW (ref 220–430)
UIBC SERPL-MCNC: 158 UG/DL — SIGNIFICANT CHANGE UP (ref 110–370)
VIT B12 SERPL-MCNC: >2000 PG/ML — HIGH (ref 232–1245)
WBC # BLD: 15.43 K/UL — HIGH (ref 4.8–10.8)
WBC # FLD AUTO: 15.43 K/UL — HIGH (ref 4.8–10.8)

## 2024-11-10 PROCEDURE — 99232 SBSQ HOSP IP/OBS MODERATE 35: CPT

## 2024-11-10 PROCEDURE — 99223 1ST HOSP IP/OBS HIGH 75: CPT

## 2024-11-10 PROCEDURE — 93010 ELECTROCARDIOGRAM REPORT: CPT

## 2024-11-10 RX ORDER — HEPARIN SODIUM 10000 [USP'U]/ML
5000 INJECTION INTRAVENOUS; SUBCUTANEOUS EVERY 12 HOURS
Refills: 0 | Status: DISCONTINUED | OUTPATIENT
Start: 2024-11-10 | End: 2024-11-13

## 2024-11-10 RX ORDER — METOPROLOL TARTRATE 50 MG
12.5 TABLET ORAL
Refills: 0 | Status: DISCONTINUED | OUTPATIENT
Start: 2024-11-10 | End: 2024-11-11

## 2024-11-10 RX ORDER — ACETAMINOPHEN 500 MG
650 TABLET ORAL EVERY 6 HOURS
Refills: 0 | Status: DISCONTINUED | OUTPATIENT
Start: 2024-11-10 | End: 2024-11-13

## 2024-11-10 RX ORDER — DEXTROMETHORPHAN HBR. AND GUAIFENESIN 20; 200 MG/10ML; MG/10ML
10 SOLUTION ORAL
Refills: 0 | Status: DISCONTINUED | OUTPATIENT
Start: 2024-11-10 | End: 2024-11-13

## 2024-11-10 RX ADMIN — AMPICILLIN AND SULBACTAM 200 GRAM(S): 2; 1 INJECTION, POWDER, FOR SOLUTION INTRAMUSCULAR; INTRAVENOUS at 05:01

## 2024-11-10 RX ADMIN — AMPICILLIN AND SULBACTAM 200 GRAM(S): 2; 1 INJECTION, POWDER, FOR SOLUTION INTRAMUSCULAR; INTRAVENOUS at 17:39

## 2024-11-10 RX ADMIN — DEXTROMETHORPHAN HBR. AND GUAIFENESIN 10 MILLILITER(S): 20; 200 SOLUTION ORAL at 15:20

## 2024-11-10 RX ADMIN — Medication 325 MILLIGRAM(S): at 11:12

## 2024-11-10 RX ADMIN — PANTOPRAZOLE SODIUM 40 MILLIGRAM(S): 40 TABLET, DELAYED RELEASE ORAL at 05:01

## 2024-11-10 RX ADMIN — HEPARIN SODIUM 5000 UNIT(S): 10000 INJECTION INTRAVENOUS; SUBCUTANEOUS at 17:40

## 2024-11-10 RX ADMIN — Medication 12.5 MILLIGRAM(S): at 11:12

## 2024-11-10 RX ADMIN — Medication 650 MILLIGRAM(S): at 16:30

## 2024-11-10 RX ADMIN — EZETIMIBE 10 MILLIGRAM(S): 10 TABLET ORAL at 11:12

## 2024-11-10 RX ADMIN — Medication 650 MILLIGRAM(S): at 22:30

## 2024-11-10 RX ADMIN — Medication 650 MILLIGRAM(S): at 15:23

## 2024-11-10 RX ADMIN — Medication 650 MILLIGRAM(S): at 21:30

## 2024-11-10 NOTE — CONSULT NOTE ADULT - SUBJECTIVE AND OBJECTIVE BOX
INTERVENTIONAL RADIOLOGY CONSULT:     Procedure Requested: THoracentesis    HPI:  84F with phmx htn hld chronic anemia presents after she was found to have low hemoglobin at [facility]. Upon presentation tot he ER, patient was found to have new onset afib. Patient reports feeling short of breath for the last few days and was empirically started on antibiotics for the last three days (not sure which abx). Hgb outpatient was 5.9, repeat upon ER presentation was 6.9. Per daughter, patient has a long hisotry of anemia and has begun requiring transfusions this year. She has a colonoscopy and endoscopy workup which was reportedly negative. Patient reports some cough and shortness of breath. She denies chest pain, nausea, vomiting, or dysuria.  (08 Nov 2024 18:08)      PAST MEDICAL & SURGICAL HISTORY:  Arthritis      HLD (hyperlipidemia)      HTN (hypertension)      No significant past surgical history          MEDICATIONS  (STANDING):  ampicillin/sulbactam  IVPB 3 Gram(s) IV Intermittent two times a day  ezetimibe 10 milliGRAM(s) Oral daily  ferrous    sulfate 325 milliGRAM(s) Oral daily  metoprolol tartrate 12.5 milliGRAM(s) Oral two times a day  pantoprazole    Tablet 40 milliGRAM(s) Oral before breakfast    MEDICATIONS  (PRN):  acetaminophen     Tablet .. 650 milliGRAM(s) Oral every 6 hours PRN Temp greater or equal to 38C (100.4F), Mild Pain (1 - 3)      Allergies    No Known Allergies      Physical Exam:   Vital Signs Last 24 Hrs  T(C): 35.9 (10 Nov 2024 04:18), Max: 36.4 (09 Nov 2024 20:56)  T(F): 96.6 (10 Nov 2024 04:18), Max: 97.5 (09 Nov 2024 20:56)  HR: 49 (10 Nov 2024 04:18) (49 - 96)  BP: 100/56 (10 Nov 2024 04:18) (91/50 - 100/56)  BP(mean): --  RR: 16 (10 Nov 2024 04:18) (16 - 18)  SpO2: 98% (10 Nov 2024 04:18) (96% - 98%)    Parameters below as of 09 Nov 2024 13:37  Patient On (Oxygen Delivery Method): room air      Labs:                         10.4   15.43 )-----------( 475      ( 10 Nov 2024 06:59 )             31.5     11-10    138  |  105  |  62[HH]  ----------------------------<  82  4.1   |  15[L]  |  2.1[H]    Ca    8.4      10 Nov 2024 06:59  Phos  4.2     11-09  Mg     1.9     11-10    TPro  6.3  /  Alb  3.0[L]  /  TBili  0.3  /  DBili  x   /  AST  28  /  ALT  20  /  AlkPhos  137[H]  11-10    PT/INR - ( 10 Nov 2024 06:59 )   PT: 12.10 sec;   INR: 1.02 ratio         PTT - ( 10 Nov 2024 06:59 )  PTT:27.2 sec        Radiology & Additional Studies:     Radiology imaging reviewed.       ASSESSMENT/ PLAN:     Tiny effusion adjacent to large lobar pneumonia.  Recommend continued medical management.    No IR intervention planned at this time.      Thank you for the courtesy of this consult, please call [phone] with any further questions.

## 2024-11-10 NOTE — CONSULT NOTE ADULT - SUBJECTIVE AND OBJECTIVE BOX
HPI:  84F with phmx htn hld chronic anemia presents after she was found to have low hemoglobin at [facility]. Upon presentation tot he ER, patient was found to have new onset afib. Patient reports feeling short of breath for the last few days and was empirically started on antibiotics for the last three days (not sure which abx). Hgb outpatient was 5.9, repeat upon ER presentation was 6.9. Per daughter, patient has a long hisotry of anemia and has begun requiring transfusions this year. She has a colonoscopy and endoscopy workup which was reportedly negative. Patient reports some cough and shortness of breath. She denies chest pain, nausea, vomiting, or dysuria.  (08 Nov 2024 18:08)     I reviewed the radiology tests and hospital record including the ED chart.    I reviewed the other consultants comments that are available in the chart.    CC/ HPI Patient is a 84y old  Female who presents with a chief complaint of symptomatic anemia, new onset afib (09 Nov 2024 14:08)      Anemia    DNI: Trial NIV    Handoff    MEWS Score    Arthritis    HLD (hyperlipidemia)    HTN (hypertension)    Anemia    No significant past surgical history    LOW HEMOGLOBEN    15    Atrial fibrillation    Elevated troponin    [email or web address]        FAMILY HISTORY:      PAST MEDICAL & SURGICAL HISTORY:  Arthritis      HLD (hyperlipidemia)      HTN (hypertension)      No significant past surgical history          FAMILY HISTORY:      SOCIAL HISTORY:  Smoking: __ packs x ___ years  EtOH Use:  Marital Status:  Occupation:  Exposures:  Recent Travel:      No Known Allergies      Soc:  see Hpi.    Home prescriptions  Aspir 81 oral delayed release tablet: 1 tab(s) orally once a day  ezetimibe 10 mg oral tablet: 1 tab(s) orally once a day  famotidine 20 mg oral tablet: 1 tab(s) orally once a day  ferrous sulfate 324 mg (65 mg elemental iron) oral tablet: orally once a day      MEDICATIONS  (STANDING):  ampicillin/sulbactam  IVPB 3 Gram(s) IV Intermittent two times a day  ezetimibe 10 milliGRAM(s) Oral daily  ferrous    sulfate 325 milliGRAM(s) Oral daily  pantoprazole    Tablet 40 milliGRAM(s) Oral before breakfast    MEDICATIONS  (PRN):      lactated ringers Bolus:   250 milliLiter(s), IV Bolus, once, infuse over 1 Hour(s), Stop After 1 Doses  lactated ringers Bolus:   500 milliLiter(s), IV Bolus, once, infuse over 2 Hour(s), Stop After 1 Doses      T(C): 35.9 (11-10-24 @ 04:18), Max: 36.4 (11-09-24 @ 20:56)  HR: 49 (11-10-24 @ 04:18) (49 - 96)  BP: 100/56 (11-10-24 @ 04:18) (91/50 - 123/62)  RR: 16 (11-10-24 @ 04:18) (16 - 18)  SpO2: 98% (11-10-24 @ 04:18) (96% - 100%)  ICU Vital Signs Last 24 Hrs  T(C): 35.9 (10 Nov 2024 04:18), Max: 36.4 (09 Nov 2024 20:56)  T(F): 96.6 (10 Nov 2024 04:18), Max: 97.5 (09 Nov 2024 20:56)  HR: 49 (10 Nov 2024 04:18) (49 - 96)  BP: 100/56 (10 Nov 2024 04:18) (91/50 - 123/62)  RR: 16 (10 Nov 2024 04:18) (16 - 18)  SpO2: 98% (10 Nov 2024 04:18) (96% - 100%)    O2 Parameters below as of 09 Nov 2024 13:37  Patient On (Oxygen Delivery Method): room air            I&O's Summary    08 Nov 2024 07:01  -  09 Nov 2024 07:00  --------------------------------------------------------  IN: 0 mL / OUT: 1700 mL / NET: -1700 mL    09 Nov 2024 07:01  -  10 Nov 2024 06:21  --------------------------------------------------------  IN: 0 mL / OUT: 1800 mL / NET: -1800 mL        I&O's Detail    08 Nov 2024 07:01  -  09 Nov 2024 07:00  --------------------------------------------------------  IN:  Total IN: 0 mL    OUT:    Voided (mL): 1700 mL  Total OUT: 1700 mL    Total NET: -1700 mL      09 Nov 2024 07:01  -  10 Nov 2024 06:21  --------------------------------------------------------  IN:  Total IN: 0 mL    OUT:    Voided (mL): 1800 mL  Total OUT: 1800 mL    Total NET: -1800 mL          Drug Dosing Weight  Height (cm): 167.6 (08 Nov 2024 11:35)  Weight (kg): 61.2 (08 Nov 2024 11:35)  BMI (kg/m2): 21.8 (08 Nov 2024 11:35)  BSA (m2): 1.69 (08 Nov 2024 11:35)    LABS:                          10.8   16.11 )-----------( 459      ( 09 Nov 2024 15:01 )             32.8       11-09    136  |  107  |  60[H]  ----------------------------<  86  4.2   |  12[L]  |  1.9[H]    Ca    8.4      09 Nov 2024 04:30  Phos  4.2     11-09  Mg     1.9     11-09    TPro  6.4  /  Alb  3.1[L]  /  TBili  0.3  /  DBili  x   /  AST  30  /  ALT  20  /  AlkPhos  140[H]  11-09      LIVER FUNCTIONS - ( 09 Nov 2024 04:30 )  Alb: 3.1 g/dL / Pro: 6.4 g/dL / ALK PHOS: 140 U/L / ALT: 20 U/L / AST: 30 U/L / GGT: x                 Urinalysis Basic - ( 09 Nov 2024 04:30 )    Color: x / Appearance: x / SG: x / pH: x  Gluc: 86 mg/dL / Ketone: x  / Bili: x / Urobili: x   Blood: x / Protein: x / Nitrite: x   Leuk Esterase: x / RBC: x / WBC x   Sq Epi: x / Non Sq Epi: x / Bacteria: x          ampicillin/sulbactam  IVPB 3 Gram(s) IV Intermittent two times a day                Culture - Blood (collected 08 Nov 2024 19:51)  Source: .Blood BLOOD  Preliminary Report (10 Nov 2024 02:02):    No growth at 24 hours        RADIOLOGY:  I have personally reviewed all chest and other pertinent radiology films.      REVIEW OF SYSTEMS:   see Hpi.    PHYSICAL EXAM:       · ENMT:   Airway patent,   Nasal mucosa clear.  Mouth with normal mucosa.   No thrush    · EYES:   Clear bilaterally,   pupils equal,   round and reactive to light.    · CARDIAC:   Normal rate,   regular rhythm.    Heart sounds S1, S2.   no thrills or bruits on palpitation  normal  cardiac impulse  No murmurs, no rubs or gallops on auscultation  no edema        CAROTID:   normal systolic impulse  no bruits    · RESPIRATORY:   ales L base  normal chest expansion  not tachypneic,  no retractions or use of accessory muscles  palpation of chest is normal with no fremitus  percussion of chest demonstrates no hyperresonance or dullness    · GASTROINTESTINAL:  Abdomen soft,   non-tender,   no guarding,   + BS  liver spleen not palpable      · SKIN:   Skin normal color for race,   warm,   dry and intact.       · HEME LYMPH:   no splenomegaly.  No cervical  lymphadenopathy.  no inguinal lymphadenopathy

## 2024-11-10 NOTE — CONSULT NOTE ADULT - REASON FOR ADMISSION
symptomatic anemia, new onset afib
Anemia/ Weakness
symptomatic anemia, new onset afib
symptomatic anemia, new onset afib

## 2024-11-10 NOTE — PROGRESS NOTE ADULT - SUBJECTIVE AND OBJECTIVE BOX
LINDA UPTON  [facility] 3I 002 A ([facility] 3I)            Patient was evaluated and examined  by bedside,                 REVIEW OF SYSTEMS:  please see pertinent positives mentioned above, all other 12 ROS negative      T(C): , Max: 36.4 (11-09-24 @ 20:56)  HR: 49 (11-10-24 @ 04:18)  BP: 100/56 (11-10-24 @ 04:18)  RR: 16 (11-10-24 @ 04:18)  SpO2: 98% (11-10-24 @ 04:18)  CAPILLARY BLOOD GLUCOSE          PHYSICAL EXAM:  General: NAD  HEETN: NCAT  Lungs: scattered crackles throughout lung fields  Heart: irregular  Abdomen: Non-distended  Extremities: No emily    LAB  CBC  Date: 11-09-24 @ 15:01  Mean cell Bhadslwnia88.3  Mean cell Hemoglobin Conc32.9  Mean cell Volum 83.0  Platelet count-Automate 459  RBC Count 3.95  Red Cell Distrib Width16.2  WBC Count16.11  % Albumin, Urine--  Hematocrit 32.8  Hemoglobin 10.8  CBC  Date: 11-09-24 @ 04:30  Mean cell Ujdcrmhokz85.2  Mean cell Hemoglobin Conc33.7  Mean cell Volum 80.7  Platelet count-Automate 378  RBC Count 3.42  Red Cell Distrib Width15.9  WBC Count15.27  % Albumin, Urine--  Hematocrit 27.6  Hemoglobin 9.3  CBC  Date: 11-08-24 @ 19:51  Mean cell Dzynwzmzkf57.8  Mean cell Hemoglobin Conc32.5  Mean cell Volum 82.6  Platelet count-Automate 381  RBC Count 2.87  Red Cell Distrib Width16.9  WBC Count15.52  % Albumin, Urine--  Hematocrit 23.7  Hemoglobin 7.7  CBC  Date: 11-08-24 @ 12:15  Mean cell Azelvdbdsr59.4  Mean cell Hemoglobin Conc32.9  Mean cell Volum 80.5  Platelet count-Automate 434  RBC Count 2.61  Red Cell Distrib Width17.4  WBC Count18.51  % Albumin, Urine--  Hematocrit 21.0  Hemoglobin 6.9    BMP  11-09-24 @ 04:30  Blood Gas Arterial-Calcium,Ionized--  Blood Urea Nitrogen, Serum 60 mg/dL[H] [10 - 20]  Carbon Dioxide, Serum12 mmol/L[L] [17 - 32]  Chloride, Giisp167 mmol/L [98 - 110]  Creatinie, Serum1.9 mg/dL[H] [0.7 - 1.5]  Glucose, Serum86 mg/dL [70 - 99]  Potassium, Serum4.2 mmol/L [3.5 - 5.0]  Sodium, Serum 136 mmol/L [135 - 146]  BMP  11-08-24 @ 12:15  Blood Gas Arterial-Calcium,Ionized--  Blood Urea Nitrogen, Serum 58 mg/dL[H] [10 - 20]  Carbon Dioxide, Serum14 mmol/L[L] [17 - 32]  Chloride, Megia949 mmol/L [98 - 110]  Creatinie, Serum2.1 mg/dL[H] [0.7 - 1.5]  Glucose, Huzqc013 mg/dL[H] [70 - 99]  Potassium, Serum4.4 mmol/L [3.5 - 5.0]  Sodium, Serum 132 mmol/L[L] [135 - 146]      Microbiology:    Culture - Blood (collected 11-08-24 @ 19:51)  Source: .Blood BLOOD  Preliminary Report (11-10-24 @ 02:02):    No growth at 24 hours        RADIOLOGY & ADDITIONAL TESTS:        Medications:  ampicillin/sulbactam  IVPB 3 Gram(s) IV Intermittent two times a day  ezetimibe 10 milliGRAM(s) Oral daily  ferrous    sulfate 325 milliGRAM(s) Oral daily  pantoprazole    Tablet 40 milliGRAM(s) Oral before breakfast        Assessment and Plan:    84F with PMH notable for hypetension, hyperlipidemia,  chronic anemia, frequent ED visits for anemia requiring transfusions, presenting from nursing home after found to have Hb of 5.8, also found to have CAP and new onset atrial fibrillation    #Community acquired pneumonia  #Complex parapneumonic effusion  Minimal symptoms, with dry cough for 1 week, labs notable for leukocytosis up to 18k, patient remarkably afebrile, suspect due to immunosenescence, on RA currently. CT with LLL opacity with associated loculated parapneumonic effusion  -Status post ceftriaxone/azithromycin (11/8)-->switch to Unasyn 3gr q12 hours as per ID recs  -Follow BCx, sputum Cx  -COVID-19/Flu/RSV-->negative  -Pulm consult for diagnostic thora-->recommending IR consultation given loculated effusion. Will place order.    #Symptomatic anemia  #Anemia of chronic disease  Reports that she has anemia for three years now and has undergone extensive workup including C-scope 3 years ago which was unremarkable. Has chronically dark stool due to iron tablets. Denies any bleeding. It appears that her Hb has downtrended over the years from 10-->9-->7-8 currently. DDx includes occult chronic GI bleeding leading to EVER, however at that age, MM and MDS also need to be considered  -Follow iron studies, B12, folate-->B12/folate adequate, iron studies with low TIBC and ferritin>1000 consistent with anemia of chronic disease  -Follow FOBT-->negative samples so far  -Follow SPEP/immunofixation  -Heme/onc consulted appreciate recs-->patient would need bone marrow biopsy to rule out MDS but not withing GOC.  Plan to restart her Retacrit at 10,000 units weekly, she is due for it on Wednesdays  -GI consulted appreciate recs, monitor for clinical signs of bleeding for now, no plans for inpatient scope    #Atrial fibrillation, new onset  -Likely in the setting of anemia and infection  -Patient currently rate controlled, holding further metoprolol doses given hypotension, will closely monitor  -Likely associated CHF, received two doses of Lasix with PRBC, will hold off on further Lasix as CT without clear evidence of pulmonary edema, mainly PNA noted and patient also with soft BP   -TTE ordered and pending  -No indication for AC as per cards currently  -Cards consulted appreciate recs    #NSTEMI type 2 secondary to demand ischemia  -Trops peaked and downtrended, stop checking    Code: DNR/DNI ok for NIV  PPx: heparin, hold for thora  Dispo: acute           LINDA UPTON  [facility] 3I 002 A ([facility] 3I)            Patient was evaluated and examined  by bedside.                REVIEW OF SYSTEMS:  please see pertinent positives mentioned above, all other 12 ROS negative      T(C): , Max: 36.4 (11-09-24 @ 20:56)  HR: 49 (11-10-24 @ 04:18)  BP: 100/56 (11-10-24 @ 04:18)  RR: 16 (11-10-24 @ 04:18)  SpO2: 98% (11-10-24 @ 04:18)  CAPILLARY BLOOD GLUCOSE          PHYSICAL EXAM:  General: NAD  HEETN: NCAT  Lungs: scattered crackles throughout lung fields  Heart: irregular  Abdomen: Non-distended  Extremities: No emily    LAB  CBC  Date: 11-09-24 @ 15:01  Mean cell Hdsxgvmqil42.3  Mean cell Hemoglobin Conc32.9  Mean cell Volum 83.0  Platelet count-Automate 459  RBC Count 3.95  Red Cell Distrib Width16.2  WBC Count16.11  % Albumin, Urine--  Hematocrit 32.8  Hemoglobin 10.8  CBC  Date: 11-09-24 @ 04:30  Mean cell Rrpgsbslwm23.2  Mean cell Hemoglobin Conc33.7  Mean cell Volum 80.7  Platelet count-Automate 378  RBC Count 3.42  Red Cell Distrib Width15.9  WBC Count15.27  % Albumin, Urine--  Hematocrit 27.6  Hemoglobin 9.3  CBC  Date: 11-08-24 @ 19:51  Mean cell Tdfexvpgrp26.8  Mean cell Hemoglobin Conc32.5  Mean cell Volum 82.6  Platelet count-Automate 381  RBC Count 2.87  Red Cell Distrib Width16.9  WBC Count15.52  % Albumin, Urine--  Hematocrit 23.7  Hemoglobin 7.7  CBC  Date: 11-08-24 @ 12:15  Mean cell Oopsyusrea65.4  Mean cell Hemoglobin Conc32.9  Mean cell Volum 80.5  Platelet count-Automate 434  RBC Count 2.61  Red Cell Distrib Width17.4  WBC Count18.51  % Albumin, Urine--  Hematocrit 21.0  Hemoglobin 6.9    BMP  11-09-24 @ 04:30  Blood Gas Arterial-Calcium,Ionized--  Blood Urea Nitrogen, Serum 60 mg/dL[H] [10 - 20]  Carbon Dioxide, Serum12 mmol/L[L] [17 - 32]  Chloride, Wntqt294 mmol/L [98 - 110]  Creatinie, Serum1.9 mg/dL[H] [0.7 - 1.5]  Glucose, Serum86 mg/dL [70 - 99]  Potassium, Serum4.2 mmol/L [3.5 - 5.0]  Sodium, Serum 136 mmol/L [135 - 146]  BMP  11-08-24 @ 12:15  Blood Gas Arterial-Calcium,Ionized--  Blood Urea Nitrogen, Serum 58 mg/dL[H] [10 - 20]  Carbon Dioxide, Serum14 mmol/L[L] [17 - 32]  Chloride, Ysmpk555 mmol/L [98 - 110]  Creatinie, Serum2.1 mg/dL[H] [0.7 - 1.5]  Glucose, Vjovi995 mg/dL[H] [70 - 99]  Potassium, Serum4.4 mmol/L [3.5 - 5.0]  Sodium, Serum 132 mmol/L[L] [135 - 146]      Microbiology:    Culture - Blood (collected 11-08-24 @ 19:51)  Source: .Blood BLOOD  Preliminary Report (11-10-24 @ 02:02):    No growth at 24 hours        RADIOLOGY & ADDITIONAL TESTS:        Medications:  ampicillin/sulbactam  IVPB 3 Gram(s) IV Intermittent two times a day  ezetimibe 10 milliGRAM(s) Oral daily  ferrous    sulfate 325 milliGRAM(s) Oral daily  pantoprazole    Tablet 40 milliGRAM(s) Oral before breakfast        Assessment and Plan:    84F with PMH notable for hypetension, hyperlipidemia,  chronic anemia, frequent ED visits for anemia requiring transfusions, presenting from nursing home after found to have Hb of 5.8, also found to have CAP and new onset atrial fibrillation    #Community acquired pneumonia  #Complex parapneumonic effusion  Minimal symptoms, with dry cough for 1 week, labs notable for leukocytosis up to 18k, patient remarkably afebrile, suspect due to immunosenescence, on RA currently. CT with LLL opacity with associated loculated parapneumonic effusion  -Status post ceftriaxone/azithromycin (11/8)-->switch to Unasyn 3gr q12 hours as per ID recs  -Follow BCx, sputum Cx  -COVID-19/Flu/RSV-->negative  -Pulm consult for diagnostic thora-->recommending IR consultation given loculated effusion. Discussed with IR, no need for thoracentesis currently given small amount of effusion. Continue medical management    #Symptomatic anemia  #Anemia of chronic disease  Reports that she has anemia for three years now and has undergone extensive workup including C-scope 3 years ago which was unremarkable. Has chronically dark stool due to iron tablets. Denies any bleeding. It appears that her Hb has downtrended over the years from 10-->9-->7-8 currently. DDx includes occult chronic GI bleeding leading to EVER, however at that age, MM and MDS also need to be considered  -Follow iron studies, B12, folate-->B12/folate adequate, iron studies with low TIBC and ferritin>1000 consistent with anemia of chronic disease  -Follow FOBT-->negative samples so far  -Follow SPEP/immunofixation  -Heme/onc consulted appreciate recs-->patient would need bone marrow biopsy to rule out MDS but not within GOC.  Plan to restart her Retacrit at 10,000 units weekly, she is due for it on Wednesdays  -GI consulted appreciate recs, monitor for clinical signs of bleeding for now, no plans for inpatient scope    #Atrial fibrillation, new onset  -Likely in the setting of anemia and infection  -Patient currently rate controlled, continue metop 12.5mg BID  -Likely associated CHF, received two doses of Lasix with PRBC, will hold off on further Lasix as CT without clear evidence of pulmonary edema, mainly PNA noted and patient also with soft BP   -TTE ordered and pending  -No indication for AC as per cards currently  -Cards consulted appreciate recs    #NSTEMI type 2 secondary to demand ischemia  -Trops peaked and downtrended, stop checking    Code: DNR/DNI ok for NIV  PPx: heparin  Dispo: acute

## 2024-11-10 NOTE — CONSULT NOTE ADULT - ASSESSMENT
Impression:  Lobar Pneumonia   Possible aspiration  Likely due to GNR given age and comorbidities  very small L loculated effusion    Suggest:  Supportive care  repeat CXR Monday if enlarging effusion of status not improving re evaluate for thora likely by IR given loculated status  would continue ampicillin/sulbactam  IVPB 3 Gram(s) IV Intermittent two times a day  Abx. per ID recommendations  obtain full cultures sputum gm stain CS  check Respiratory viral panel, Strep and Legionella Ag.   Nasal MRSA  Pulmonary toilet  OOB ASAP  advance mobility as tolerated  Keep SaO2 >92% adjust O2 as needed  DVT/ Gastritis prophylaxis

## 2024-11-10 NOTE — SWALLOW BEDSIDE ASSESSMENT ADULT - SWALLOW EVAL: FUNCTIONAL LEVEL AT TIME OF EVAL
Returned moms call. Last week she had a runny nose and fevers at night, last night was first fever free night. Continues to have a \"raspy\" dry cough. Denies any difficulty breathing. No one else is sick in the home. Patient taking Tylenol at night. Encouraged mom to try  warm bathes to loosen secretions, using a humidifier, and honey. Mom verbalized understanding.        Pt reports "choking" with water at times Pt reports "choking" with water at times when she drinks too fast

## 2024-11-11 ENCOUNTER — TRANSCRIPTION ENCOUNTER (OUTPATIENT)
Age: 84
End: 2024-11-11

## 2024-11-11 LAB
ALBUMIN SERPL ELPH-MCNC: 2.7 G/DL — LOW (ref 3.5–5.2)
ALP SERPL-CCNC: 115 U/L — SIGNIFICANT CHANGE UP (ref 30–115)
ALT FLD-CCNC: 20 U/L — SIGNIFICANT CHANGE UP (ref 0–41)
ANION GAP SERPL CALC-SCNC: 16 MMOL/L — HIGH (ref 7–14)
ANION GAP SERPL CALC-SCNC: 16 MMOL/L — HIGH (ref 7–14)
ANISOCYTOSIS BLD QL: SLIGHT — SIGNIFICANT CHANGE UP
AST SERPL-CCNC: 24 U/L — SIGNIFICANT CHANGE UP (ref 0–41)
BASOPHILS # BLD AUTO: 0 K/UL — SIGNIFICANT CHANGE UP (ref 0–0.2)
BASOPHILS NFR BLD AUTO: 0 % — SIGNIFICANT CHANGE UP (ref 0–1)
BILIRUB SERPL-MCNC: 0.3 MG/DL — SIGNIFICANT CHANGE UP (ref 0.2–1.2)
BUN SERPL-MCNC: 58 MG/DL — HIGH (ref 10–20)
BUN SERPL-MCNC: 59 MG/DL — HIGH (ref 10–20)
CALCIUM SERPL-MCNC: 7.8 MG/DL — LOW (ref 8.4–10.5)
CALCIUM SERPL-MCNC: 8.2 MG/DL — LOW (ref 8.4–10.5)
CHLORIDE SERPL-SCNC: 107 MMOL/L — SIGNIFICANT CHANGE UP (ref 98–110)
CHLORIDE SERPL-SCNC: 109 MMOL/L — SIGNIFICANT CHANGE UP (ref 98–110)
CO2 SERPL-SCNC: 16 MMOL/L — LOW (ref 17–32)
CO2 SERPL-SCNC: 16 MMOL/L — LOW (ref 17–32)
CREAT SERPL-MCNC: 1.8 MG/DL — HIGH (ref 0.7–1.5)
CREAT SERPL-MCNC: 2 MG/DL — HIGH (ref 0.7–1.5)
EGFR: 24 ML/MIN/1.73M2 — LOW
EGFR: 28 ML/MIN/1.73M2 — LOW
EOSINOPHIL NFR BLD AUTO: 0 % — SIGNIFICANT CHANGE UP (ref 0–8)
GLUCOSE SERPL-MCNC: 106 MG/DL — HIGH (ref 70–99)
GLUCOSE SERPL-MCNC: 85 MG/DL — SIGNIFICANT CHANGE UP (ref 70–99)
HCT VFR BLD CALC: 28.2 % — LOW (ref 37–47)
HGB BLD-MCNC: 9.2 G/DL — LOW (ref 12–16)
HYPOCHROMIA BLD QL: SLIGHT — SIGNIFICANT CHANGE UP
LYMPHOCYTES # BLD AUTO: 0.39 K/UL — LOW (ref 1.2–3.4)
LYMPHOCYTES # BLD AUTO: 3 % — LOW (ref 20.5–51.1)
MAGNESIUM SERPL-MCNC: 1.9 MG/DL — SIGNIFICANT CHANGE UP (ref 1.8–2.4)
MANUAL SMEAR VERIFICATION: SIGNIFICANT CHANGE UP
MCHC RBC-ENTMCNC: 27.3 PG — SIGNIFICANT CHANGE UP (ref 27–31)
MCHC RBC-ENTMCNC: 32.6 G/DL — SIGNIFICANT CHANGE UP (ref 32–37)
MCV RBC AUTO: 83.7 FL — SIGNIFICANT CHANGE UP (ref 81–99)
METAMYELOCYTES # FLD: 2 % — HIGH (ref 0–0)
MONOCYTES # BLD AUTO: 1.29 K/UL — HIGH (ref 0.1–0.6)
MONOCYTES NFR BLD AUTO: 10 % — HIGH (ref 1.7–9.3)
NEUTROPHILS # BLD AUTO: 10.93 K/UL — HIGH (ref 1.4–6.5)
NEUTROPHILS NFR BLD AUTO: 85 % — HIGH (ref 42.2–75.2)
NRBC # BLD: 1 /100 WBCS — HIGH (ref 0–0)
NRBC # BLD: SIGNIFICANT CHANGE UP /100 WBCS (ref 0–0)
PHOSPHATE SERPL-MCNC: 4.2 MG/DL — SIGNIFICANT CHANGE UP (ref 2.1–4.9)
PLAT MORPH BLD: NORMAL — SIGNIFICANT CHANGE UP
PLATELET # BLD AUTO: 468 K/UL — HIGH (ref 130–400)
PLATELET CLUMP BLD QL SMEAR: ABNORMAL
PLATELET COUNT - ESTIMATE: ABNORMAL
PMV BLD: 10.2 FL — SIGNIFICANT CHANGE UP (ref 7.4–10.4)
POTASSIUM SERPL-MCNC: 3.8 MMOL/L — SIGNIFICANT CHANGE UP (ref 3.5–5)
POTASSIUM SERPL-MCNC: 3.8 MMOL/L — SIGNIFICANT CHANGE UP (ref 3.5–5)
POTASSIUM SERPL-SCNC: 3.8 MMOL/L — SIGNIFICANT CHANGE UP (ref 3.5–5)
POTASSIUM SERPL-SCNC: 3.8 MMOL/L — SIGNIFICANT CHANGE UP (ref 3.5–5)
PROT SERPL-MCNC: 5.6 G/DL — LOW (ref 6–8)
RBC # BLD: 3.37 M/UL — LOW (ref 4.2–5.4)
RBC # FLD: 17 % — HIGH (ref 11.5–14.5)
RBC BLD AUTO: ABNORMAL
SODIUM SERPL-SCNC: 139 MMOL/L — SIGNIFICANT CHANGE UP (ref 135–146)
SODIUM SERPL-SCNC: 141 MMOL/L — SIGNIFICANT CHANGE UP (ref 135–146)
WBC # BLD: 12.86 K/UL — HIGH (ref 4.8–10.8)
WBC # FLD AUTO: 12.86 K/UL — HIGH (ref 4.8–10.8)

## 2024-11-11 PROCEDURE — 93010 ELECTROCARDIOGRAM REPORT: CPT

## 2024-11-11 PROCEDURE — 99232 SBSQ HOSP IP/OBS MODERATE 35: CPT

## 2024-11-11 PROCEDURE — 71045 X-RAY EXAM CHEST 1 VIEW: CPT | Mod: 26

## 2024-11-11 RX ORDER — AMOXICILLIN AND CLAVULANATE POTASSIUM 600; 42.9 MG/5ML; MG/5ML
1 POWDER, FOR SUSPENSION ORAL
Refills: 0 | Status: DISCONTINUED | OUTPATIENT
Start: 2024-11-11 | End: 2024-11-13

## 2024-11-11 RX ORDER — METOPROLOL TARTRATE 50 MG
12.5 TABLET ORAL EVERY 8 HOURS
Refills: 0 | Status: DISCONTINUED | OUTPATIENT
Start: 2024-11-11 | End: 2024-11-12

## 2024-11-11 RX ADMIN — HEPARIN SODIUM 5000 UNIT(S): 10000 INJECTION INTRAVENOUS; SUBCUTANEOUS at 05:43

## 2024-11-11 RX ADMIN — HEPARIN SODIUM 5000 UNIT(S): 10000 INJECTION INTRAVENOUS; SUBCUTANEOUS at 17:52

## 2024-11-11 RX ADMIN — PANTOPRAZOLE SODIUM 40 MILLIGRAM(S): 40 TABLET, DELAYED RELEASE ORAL at 05:42

## 2024-11-11 RX ADMIN — Medication 12.5 MILLIGRAM(S): at 21:37

## 2024-11-11 RX ADMIN — AMPICILLIN AND SULBACTAM 200 GRAM(S): 2; 1 INJECTION, POWDER, FOR SOLUTION INTRAMUSCULAR; INTRAVENOUS at 05:42

## 2024-11-11 RX ADMIN — Medication 250 MILLILITER(S): at 12:14

## 2024-11-11 RX ADMIN — EZETIMIBE 10 MILLIGRAM(S): 10 TABLET ORAL at 12:28

## 2024-11-11 RX ADMIN — Medication 12.5 MILLIGRAM(S): at 05:42

## 2024-11-11 RX ADMIN — Medication 650 MILLIGRAM(S): at 12:29

## 2024-11-11 RX ADMIN — Medication 325 MILLIGRAM(S): at 12:28

## 2024-11-11 RX ADMIN — Medication 650 MILLIGRAM(S): at 13:56

## 2024-11-11 RX ADMIN — AMOXICILLIN AND CLAVULANATE POTASSIUM 1 TABLET(S): 600; 42.9 POWDER, FOR SUSPENSION ORAL at 17:52

## 2024-11-11 NOTE — PROGRESS NOTE ADULT - ASSESSMENT
This is a 84 year old female with PMH of HTN, HLD, chronic anemia presents after she was found to have low hemoglobin at [facility].    IMPRESSION  #Leukocytosis- Likely reactive from anemia   #CT showing left lower lobe consolidation with underlying parapneumonic effusion. Possibly partially loculated.   Clinically no bacterial pneumonia. Possibility of chronic aspiration vs underlying undiagnosed malignancy?  #Acute on chronic anemia-Oncology evaluation pending-Perhaps MDS?   #Type 2 NSTEMII, possible cardiomyopathy.   Getting iron infusion? 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  Flu/RSV/Covid negative    RECOMMENDATIONS  -Follow up with RSV/Flu/Covid- Patient on Room air and no respiratory distress.   -Consider obtaining TTE.  -Speech and swallow follow up.   -Pulmonary and IR evaluation noted.  -IV Unasyn 3 gram q 12 hours. (S/D 11/9)  -Will transition to PO Augmentin   -Off loading to prevent pressure sores and preventive measures to avoid aspiration  -Guarded prognosis. GOC.     If any questions, please send a message or call on [employer] Teams  Please continue to update ID with any pertinent new laboratory or radiographic findings.    Nabeel Lopez M.D  Infectious Diseases Attending/   [employer] at [facility]   This is a 84 year old female with PMH of HTN, HLD, chronic anemia presents after she was found to have low hemoglobin at [facility].    IMPRESSION  #Leukocytosis- Likely reactive from anemia   #CT showing left lower lobe consolidation with underlying parapneumonic effusion. Possibly partially loculated.   Clinically no bacterial pneumonia. Possibility of chronic aspiration vs underlying undiagnosed malignancy?  #Acute on chronic anemia-Oncology evaluation pending-Perhaps MDS?   #Type 2 NSTEMII, possible cardiomyopathy.   Getting iron infusion? 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  Flu/RSV/Covid negative    RECOMMENDATIONS  -Speech and swallow follow up.   -Pulmonary and IR evaluation noted.  -IV Unasyn 3 gram q 12 hours. (S/D 11/9)  -Will transition to PO Augmentin   -Off loading to prevent pressure sores and preventive measures to avoid aspiration  -Guarded prognosis. GOC.     If any questions, please send a message or call on [employer] Teams  Please continue to update ID with any pertinent new laboratory or radiographic findings.    Nabeel Lopez M.D  Infectious Diseases Attending/   [employer] at [facility]   This is a 84 year old female with PMH of HTN, HLD, chronic anemia presents after she was found to have low hemoglobin at [facility].    IMPRESSION  #Leukocytosis- Likely reactive from anemia   #CT showing left lower lobe consolidation with underlying parapneumonic effusion. Possibly partially loculated.   Clinically no bacterial pneumonia. Possibility of chronic aspiration vs underlying undiagnosed malignancy?  #Acute on chronic anemia-Oncology evaluation pending-Perhaps MDS?   #Type 2 NSTEMII, possible cardiomyopathy.   Getting iron infusion? 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  Flu/RSV/Covid negative    RECOMMENDATIONS  -Speech and swallow follow up.   -Pulmonary and IR evaluation noted. Patient also not agreeable for further interventions.   -IV Unasyn 3 gram q 12 hours. (S/D 11/9)  -Will transition to PO Augmentin 500-125 BID for total of 10 days from 11/9.  -Off loading to prevent pressure sores and preventive measures to avoid aspiration  -Guarded prognosis. GOC.     If any questions, please send a message or call on [employer] Teams  Please continue to update ID with any pertinent new laboratory or radiographic findings.    Nabeel Lopez M.D  Infectious Diseases Attending/   [employer] at [facility]

## 2024-11-11 NOTE — SWALLOW BEDSIDE ASSESSMENT ADULT - SWALLOW EVAL: DIAGNOSIS
+toleration observed w/o s/s of aspiration/penetration for regular with small controlled sips of thin liquids. Delayed
+toleration observed w/o s/s of aspiration/penetration for puree, soft + bite, and regular consistencies. Min overts for thin liquids

## 2024-11-11 NOTE — SWALLOW BEDSIDE ASSESSMENT ADULT - SLP PERTINENT HISTORY OF CURRENT PROBLEM
84F with PMH notable for hypetension, hyperlipidemia,  chronic anemia, frequent ED visits for anemia requiring transfusions, presenting from nursing home after found to have Hb of 5.8, also found to have CAP and new onset atrial fibrillation
84F with PMH notable for hypetension, hyperlipidemia,  chronic anemia, frequent ED visits for anemia requiring transfusions, presenting from nursing home after found to have Hb of 5.8, also found to have CAP and new onset atrial fibrillation

## 2024-11-11 NOTE — PROGRESS NOTE ADULT - SUBJECTIVE AND OBJECTIVE BOX
LINDA UPTON  84y, Female    LOS  3d    INTERVAL EVENTS/HPI  - No acute events overnight  - T(F): , Max: 98.3 (11-11-24 @ 04:39)  - WBC Count: 15.43 (11-10-24 @ 06:59)  WBC Count: 16.11 (11-09-24 @ 15:01)  - Creatinine: 2.1 (11-10-24 @ 06:59)    REVIEW OF SYSTEMS:  CONSTITUTIONAL: No fever or chills  HEENT: No sore throat  RESPIRATORY: No cough, no shortness of breath  CARDIOVASCULAR: No chest pain or palpitations  GASTROINTESTINAL: No abdominal or epigastric pain  GENITOURINARY: No dysuria  NEUROLOGICAL: No headache/dizziness  MSK: No joint pain, erythema, or swelling; no back pain  SKIN: No itching, rashes  All other ROS negative except noted above    Prior hospital charts reviewed [Yes]  Primary team notes reviewed [Yes]  Other consultant notes reviewed [Yes]    ANTIMICROBIALS:   ampicillin/sulbactam  IVPB 3 two times a day      OTHER MEDS: MEDICATIONS  (STANDING):  acetaminophen     Tablet .. 650 every 6 hours PRN  ezetimibe 10 daily  guaifenesin/dextromethorphan Oral Liquid 10 four times a day PRN  heparin   Injectable 5000 every 12 hours  metoprolol tartrate 12.5 two times a day  pantoprazole    Tablet 40 before breakfast      Vital Signs Last 24 Hrs  T(F): 98.3 (11-11-24 @ 04:39), Max: 98.9 (11-09-24 @ 05:00)    Vital Signs Last 24 Hrs  HR: 84 (11-11-24 @ 04:39) (75 - 90)  BP: 111/56 (11-11-24 @ 04:39) (92/59 - 111/56)  RR: 18 (11-11-24 @ 04:39)  SpO2: 96% (11-11-24 @ 04:39) (96% - 97%)  Wt(kg): --    EXAM:  GENERAL: NAD, frail looking.   HEAD: No head lesions  NECK: Supple  CHEST/LUNG: Shallow breath sounds.   HEART: S1 S2  ABDOMEN: Soft, nontender  EXTREMITIES: No clubbing. + PICC line in the right arm.   NERVOUS SYSTEM: Alert and oriented to person  MSK: No joint erythema, swelling or pain  SKIN: No rashes or lesions, no superficial thrombophlebitis  Labs:                        10.4   15.43 )-----------( 475      ( 10 Nov 2024 06:59 )             31.5     11-10    138  |  105  |  62[HH]  ----------------------------<  82  4.1   |  15[L]  |  2.1[H]    Ca    8.4      10 Nov 2024 06:59  Mg     1.9     11-10    TPro  6.3  /  Alb  3.0[L]  /  TBili  0.3  /  DBili  x   /  AST  28  /  ALT  20  /  AlkPhos  137[H]  11-10      WBC Trend:  WBC Count: 15.43 (11-10-24 @ 06:59)  WBC Count: 16.11 (11-09-24 @ 15:01)  WBC Count: 15.27 (11-09-24 @ 04:30)  WBC Count: 15.52 (11-08-24 @ 19:51)      Creatine Trend:  Creatinine: 2.1 (11-10)  Creatinine: 1.9 (11-09)  Creatinine: 2.1 (11-08)      Liver Biochemical Testing Trend:  Alanine Aminotransferase (ALT/SGPT): 20 (11-10)  Alanine Aminotransferase (ALT/SGPT): 20 (11-09)  Alanine Aminotransferase (ALT/SGPT): 25 (11-08)  Alanine Aminotransferase (ALT/SGPT): 10 (10-24)  Alanine Aminotransferase (ALT/SGPT): TNP (10-17)  Aspartate Aminotransferase (AST/SGOT): 28 (11-10-24 @ 06:59)  Aspartate Aminotransferase (AST/SGOT): 30 (11-09-24 @ 04:30)  Aspartate Aminotransferase (AST/SGOT): 43 (11-08-24 @ 12:15)  Aspartate Aminotransferase (AST/SGOT): 18 (10-24-24 @ 12:55)  Aspartate Aminotransferase (AST/SGOT): TNP (10-17-24 @ 18:00)  Bilirubin Total: 0.3 (11-10)  Bilirubin Total: 0.3 (11-09)  Bilirubin Total: <0.2 (11-08)  Bilirubin Total: 0.3 (10-24)  Bilirubin Total: TNP (10-17)      Trend LDH      Urinalysis Basic - ( 10 Nov 2024 06:59 )    Color: x / Appearance: x / SG: x / pH: x  Gluc: 82 mg/dL / Ketone: x  / Bili: x / Urobili: x   Blood: x / Protein: x / Nitrite: x   Leuk Esterase: x / RBC: x / WBC x   Sq Epi: x / Non Sq Epi: x / Bacteria: x        MICROBIOLOGY:    Female    Culture - Urine (collected 08 Nov 2024 20:09)  Source: Clean Catch Clean Catch (Midstream)  Final Report:    <10,000 CFU/mL Normal Urogenital Nusrat    Culture - Blood (collected 08 Nov 2024 19:51)  Source: .Blood BLOOD  Preliminary Report:    No growth at 48 Hours    Legionella Antigen, Urine: Negative (11-08 @ 20:09)    Ferritin: 1356 (11-09)    Troponin T, High Sensitivity Result: 825 (11-08)  Troponin T, High Sensitivity Result: 988 (11-08)  Troponin T, High Sensitivity Result: 965 (11-08)          RADIOLOGY & ADDITIONAL TESTS:  I have personally reviewed the relevant images.   CXR      CT  CT Chest No Cont:   ACC: [id number] EXAM:  CT CHEST   ORDERED BY: STEVEN STOVER     PROCEDURE DATE:  11/09/2024          INTERPRETATION:  CLINICAL INFORMATION: Evaluation for pneumonia.    COMPARISON: None.    CONTRAST/COMPLICATIONS:  IV Contrast: NONE  Oral Contrast: NONE  Complications: None reported at time of study completion    PROCEDURE:  CT of the Chest was performed.  Sagittal and coronal reformats were performed.    FINDINGS:    LUNGS/AIRWAYS/PLEURA: Trachea is clear. Subpleural reticulations possibly   related to scarring or fibrotic changes. Right lower lobe dependent   subsegmental atelectasis. Left lower lobe consolidation with air   bronchograms compatible with pneumonia. Underlying parapneumonic   effusion, possibly partially loculated.  VESSELS: Aortic and coronary artery and branch vessel atherosclerosis.  HEART: Cardiomegaly. Mitral annulus calcifications. Aortic valve   calcifications. Coronary artery calcifications. Trace fluid in the   superior pericardial recess  MEDIASTINUM AND DIEGO: No lymphadenopathy.  CHEST WALL AND LOWER NECK: Within normal limits.  VISUALIZED UPPER ABDOMEN: No focal abnormality.  BONES: Diffusely sclerotic bones, nonspecific can be seen with renal   osteodystrophy. Degenerative changes along the vertebral column.   Bilateral shoulder osteoarthritic changes more pronounced on the right.    IMPRESSION:  1.  Left lower lobe pneumonia with underlying parapneumonic effusion,   possibly partially loculated along the upper lung field.  2.  Diffusely sclerotic vertebral column, nonspecific possibly related to   renal osteodystrophy if there is a history of chronic renal disease.      --- End of Report ---            STORMY SIBLEY MD; Attending Radiologist  This document has been electronically signed. Nov 9 2024  9:39AM (11-09-24 @ 09:29)        WEIGHT  Weight (kg): 61.2 (11-08-24 @ 11:35)      All available historical records have been reviewed

## 2024-11-11 NOTE — SWALLOW BEDSIDE ASSESSMENT ADULT - DIET PRIOR TO ADMI
regular consistency and thin liquids per Елена PORRAS paperwork
Regular consistency and thin liquids per Елена PORRAS paperwork

## 2024-11-11 NOTE — PROGRESS NOTE ADULT - SUBJECTIVE AND OBJECTIVE BOX
LINDA UPT[place]  [facility] 3I 002 A ([facility] 3I)            Patient was evaluated and examined  by bedside.                REVIEW OF SYSTEMS:  please see pertinent positives mentioned above, all other 12 ROS negative      T(C): , Max: 36.8 (11-11-24 @ 04:39)  HR: 84 (11-11-24 @ 04:39)  BP: 111/56 (11-11-24 @ 04:39)  RR: 18 (11-11-24 @ 04:39)  SpO2: 96% (11-11-24 @ 04:39)  CAPILLARY BLOOD GLUCOSE          PHYSICAL EXAM:  General: NAD  HEETN: NCAT  Lungs: scattered crackles throughout lung fields  Heart: irregular  Abdomen: Non-distended  Extremities: No edema      LAB  CBC  Date: 11-11-24 @ 06:52  Mean cell Knhrrwgfqo69.3  Mean cell Hemoglobin Conc32.6  Mean cell Volum 83.7  Platelet count-Automate 468  RBC Count 3.37  Red Cell Distrib Width17.0  WBC Count12.86  % Albumin, Urine--  Hematocrit 28.2  Hemoglobin 9.2  CBC  Date: 11-10-24 @ 06:59  Mean cell Oecbvnnpvd71.3  Mean cell Hemoglobin Conc33.0  Mean cell Volum 82.7  Platelet count-Automate 475  RBC Count 3.81  Red Cell Distrib Width16.8  WBC Count15.43  % Albumin, Urine--  Hematocrit 31.5  Hemoglobin 10.4  CBC  Date: 11-09-24 @ 15:01  Mean cell Muwelvvprq14.3  Mean cell Hemoglobin Conc32.9  Mean cell Volum 83.0  Platelet count-Automate 459  RBC Count 3.95  Red Cell Distrib Width16.2  WBC Count16.11  % Albumin, Urine--  Hematocrit 32.8  Hemoglobin 10.8  CBC  Date: 11-09-24 @ 04:30  Mean cell Bycfonaxfb35.2  Mean cell Hemoglobin Conc33.7  Mean cell Volum 80.7  Platelet count-Automate 378  RBC Count 3.42  Red Cell Distrib Width15.9  WBC Count15.27  % Albumin, Urine--  Hematocrit 27.6  Hemoglobin 9.3  CBC  Date: 11-08-24 @ 19:51  Mean cell Baitzervtd18.8  Mean cell Hemoglobin Conc32.5  Mean cell Volum 82.6  Platelet count-Automate 381  RBC Count 2.87  Red Cell Distrib Width16.9  WBC Count15.52  % Albumin, Urine--  Hematocrit 23.7  Hemoglobin 7.7  CBC  Date: 11-08-24 @ 12:15  Mean cell Kxwhuaeoyb60.4  Mean cell Hemoglobin Conc32.9  Mean cell Volum 80.5  Platelet count-Automate 434  RBC Count 2.61  Red Cell Distrib Width17.4  WBC Count18.51  % Albumin, Urine--  Hematocrit 21.0  Hemoglobin 6.9    [facility]  11-11-24 @ 06:52  Blood Gas Arterial-Calcium,Ionized--  Blood Urea Nitrogen, Serum 58 mg/dL[H] [10 - 20]  Carbon Dioxide, Serum16 mmol/L[L] [17 - 32]  Chloride, Blxap553 mmol/L [98 - 110]  Creatinie, Serum2.0 mg/dL[H] [0.7 - 1.5]  Glucose, Serum85 mg/dL [70 - 99]  Potassium, Serum3.8 mmol/L [3.5 - 5.0]  Sodium, Serum 141 mmol/L [135 - 146]  [facility]  11-10-24 @ 06:59  Blood Gas Arterial-Calcium,Ionized--  Blood Urea Nitrogen, Serum 62 mg/dL[HH] [10 - 20] [Critical value:]  Carbon Dioxide, Serum15 mmol/L[L] [17 - 32]  Chloride, Zqshf122 mmol/L [98 - 110]  Creatinie, Serum2.1 mg/dL[H] [0.7 - 1.5]  Glucose, Serum82 mg/dL [70 - 99]  Potassium, Serum4.1 mmol/L [3.5 - 5.0]  Sodium, Serum 138 mmol/L [135 - 146]  [facility]  11-09-24 @ 04:30  Blood Gas Arterial-Calcium,Ionized--  Blood Urea Nitrogen, Serum 60 mg/dL[H] [10 - 20]  Carbon Dioxide, Serum12 mmol/L[L] [17 - 32]  Chloride, Nnjxn214 mmol/L [98 - 110]  Creatinie, Serum1.9 mg/dL[H] [0.7 - 1.5]  Glucose, Serum86 mg/dL [70 - 99]  Potassium, Serum4.2 mmol/L [3.5 - 5.0]  Sodium, Serum 136 mmol/L [135 - 146]  [facility]  11-08-24 @ 12:15  Blood Gas Arterial-Calcium,Ionized--  Blood Urea Nitrogen, Serum 58 mg/dL[H] [10 - 20]  Carbon Dioxide, Serum14 mmol/L[L] [17 - 32]  Chloride, Wratc510 mmol/L [98 - 110]  Creatinie, Serum2.1 mg/dL[H] [0.7 - 1.5]  Glucose, Jkmjg260 mg/dL[H] [70 - 99]  Potassium, Serum4.4 mmol/L [3.5 - 5.0]  Sodium, Serum 132 mmol/L[L] [135 - 146]        PT/INR - ( 10 Nov 2024 06:59 )   PT: 12.10 sec;   INR: 1.02 ratio         PTT - ( 10 Nov 2024 06:59 )  PTT:27.2 sec      Microbiology:    Culture - Urine (collected 11-08-24 @ 20:09)  Source: Clean Catch Clean Catch (Midstream)  Final Report (11-10-24 @ 11:15):    <10,000 CFU/mL Normal Urogenital Nusrat    Culture - Blood (collected 11-08-24 @ 19:51)  Source: .Blood BLOOD  Preliminary Report (11-11-24 @ 02:01):    No growth at 48 Hours        RADIOLOGY & ADDITIONAL TESTS:        Medications:  acetaminophen     Tablet .. 650 milliGRAM(s) Oral every 6 hours PRN  ampicillin/sulbactam  IVPB 3 Gram(s) IV Intermittent two times a day  ezetimibe 10 milliGRAM(s) Oral daily  ferrous    sulfate 325 milliGRAM(s) Oral daily  guaifenesin/dextromethorphan Oral Liquid 10 milliLiter(s) Oral four times a day PRN  heparin   Injectable 5000 Unit(s) SubCutaneous every 12 hours  lactated ringers Bolus 500 milliLiter(s) IV Bolus once  metoprolol tartrate 12.5 milliGRAM(s) Oral two times a day  pantoprazole    Tablet 40 milliGRAM(s) Oral before breakfast        Assessment and Plan:    84F with PMH notable for hypetension, hyperlipidemia,  chronic anemia, frequent ED visits for anemia requiring transfusions, presenting from nursing home after found to have Hb of 5.8, also found to have CAP and new onset atrial fibrillation    #Community acquired pneumonia  #Complex parapneumonic effusion  Minimal symptoms, with dry cough for 1 week, labs notable for leukocytosis up to 18k, patient remarkably afebrile, suspect due to immunosenescence, on RA currently. CT with LLL opacity with associated loculated parapneumonic effusion  -Status post ceftriaxone/azithromycin (11/8)-->switch to Unasyn 3gr q12 hours as per ID recs, today is day 4 of antibiotics. Plan to switch to oral Augmentin, duration TBD by ID  -Follow BCx, sputum Cx  -COVID-19/Flu/RSV-->negative  -Pulm consult for diagnostic thora-->recommending IR consultation given loculated effusion. Discussed with IR, no need for thoracentesis currently given small amount of effusion. Continue medical management    #Symptomatic anemia  #Anemia of chronic disease  Reports that she has anemia for three years now and has undergone extensive workup including C-scope 3 years ago which was unremarkable. Has chronically dark stool due to iron tablets. Denies any bleeding. It appears that her Hb has downtrended over the years from 10-->9-->7-8 currently. DDx includes occult chronic GI bleeding leading to EVER, however at that age, MM and MDS also need to be considered  -Follow iron studies, B12, folate-->B12/folate adequate, iron studies with low TIBC and ferritin>1000 consistent with anemia of chronic disease  -Follow FOBT-->negative samples so far  -Follow SPEP/immunofixation  -Heme/onc consulted appreciate recs-->patient would need bone marrow biopsy to rule out MDS but not within GOC.  Plan to restart her Retacrit at 10,000 units weekly, she is due for it on Wednesdays  -GI consulted appreciate recs, monitor for clinical signs of bleeding for now, no plans for inpatient scope    #KINGA on CKD  -Query KINGA vs. progressing CKD  -Will trial a small bolus of fluids today to see if it improves  -Overall appears euvolemic so cardiorenal less likely  -Nephrology consult    #Atrial fibrillation, new onset  -Likely in the setting of anemia and infection  -Patient currently rate controlled, continue metop 12.5mg BID  -Likely associated CHF, received two doses of Lasix with PRBC, will hold off on further Lasix as CT without clear evidence of pulmonary edema, mainly PNA noted and patient also with soft BP   -TTE ordered and pending-->patient refused  -No indication for AC as per cards currently  -Cards consulted appreciate recs    #NSTEMI type 2 secondary to demand ischemia  -Trops peaked and downtrended, stop checking    Code: DNR/DNI ok for NIV  PPx: heparin  Dispo: Anticipated discharge in 24 hours

## 2024-11-11 NOTE — SWALLOW BEDSIDE ASSESSMENT ADULT - SWALLOW EVAL: RECOMMENDED DIET
regular consistency with small controlled sips of thin liquids as tolerated. Aspiration precautions.
regular consistency and thin liquids as tolerated. Aspiration precautions.

## 2024-11-12 LAB
ANION GAP SERPL CALC-SCNC: 14 MMOL/L — SIGNIFICANT CHANGE UP (ref 7–14)
BUN SERPL-MCNC: 55 MG/DL — HIGH (ref 10–20)
CALCIUM SERPL-MCNC: 8.1 MG/DL — LOW (ref 8.4–10.5)
CHLORIDE SERPL-SCNC: 110 MMOL/L — SIGNIFICANT CHANGE UP (ref 98–110)
CO2 SERPL-SCNC: 17 MMOL/L — SIGNIFICANT CHANGE UP (ref 17–32)
CREAT SERPL-MCNC: 1.9 MG/DL — HIGH (ref 0.7–1.5)
EGFR: 26 ML/MIN/1.73M2 — LOW
GLUCOSE SERPL-MCNC: 96 MG/DL — SIGNIFICANT CHANGE UP (ref 70–99)
HCT VFR BLD CALC: 29.8 % — LOW (ref 37–47)
HGB BLD-MCNC: 9.5 G/DL — LOW (ref 12–16)
MCHC RBC-ENTMCNC: 26.4 PG — LOW (ref 27–31)
MCHC RBC-ENTMCNC: 31.9 G/DL — LOW (ref 32–37)
MCV RBC AUTO: 82.8 FL — SIGNIFICANT CHANGE UP (ref 81–99)
NRBC # BLD: 0 /100 WBCS — SIGNIFICANT CHANGE UP (ref 0–0)
PLATELET # BLD AUTO: 456 K/UL — HIGH (ref 130–400)
PMV BLD: 9.1 FL — SIGNIFICANT CHANGE UP (ref 7.4–10.4)
POTASSIUM SERPL-MCNC: 4 MMOL/L — SIGNIFICANT CHANGE UP (ref 3.5–5)
POTASSIUM SERPL-SCNC: 4 MMOL/L — SIGNIFICANT CHANGE UP (ref 3.5–5)
RBC # BLD: 3.6 M/UL — LOW (ref 4.2–5.4)
RBC # FLD: 17.6 % — HIGH (ref 11.5–14.5)
SODIUM SERPL-SCNC: 141 MMOL/L — SIGNIFICANT CHANGE UP (ref 135–146)
WBC # BLD: 12.94 K/UL — HIGH (ref 4.8–10.8)
WBC # FLD AUTO: 12.94 K/UL — HIGH (ref 4.8–10.8)

## 2024-11-12 PROCEDURE — 99232 SBSQ HOSP IP/OBS MODERATE 35: CPT

## 2024-11-12 RX ORDER — METOPROLOL TARTRATE 50 MG
25 TABLET ORAL
Refills: 0 | Status: DISCONTINUED | OUTPATIENT
Start: 2024-11-12 | End: 2024-11-13

## 2024-11-12 RX ORDER — METOPROLOL TARTRATE 50 MG
25 TABLET ORAL ONCE
Refills: 0 | Status: COMPLETED | OUTPATIENT
Start: 2024-11-12 | End: 2024-11-12

## 2024-11-12 RX ORDER — SODIUM BICARBONATE 1 MEQ/ML
650 VIAL (ML) INTRAVENOUS THREE TIMES A DAY
Refills: 0 | Status: DISCONTINUED | OUTPATIENT
Start: 2024-11-12 | End: 2024-11-13

## 2024-11-12 RX ORDER — METOPROLOL TARTRATE 50 MG
25 TABLET ORAL
Refills: 0 | Status: DISCONTINUED | OUTPATIENT
Start: 2024-11-12 | End: 2024-11-12

## 2024-11-12 RX ADMIN — Medication 650 MILLIGRAM(S): at 13:14

## 2024-11-12 RX ADMIN — Medication 12.5 MILLIGRAM(S): at 05:34

## 2024-11-12 RX ADMIN — HEPARIN SODIUM 5000 UNIT(S): 10000 INJECTION INTRAVENOUS; SUBCUTANEOUS at 17:08

## 2024-11-12 RX ADMIN — Medication 650 MILLIGRAM(S): at 13:13

## 2024-11-12 RX ADMIN — Medication 325 MILLIGRAM(S): at 11:43

## 2024-11-12 RX ADMIN — Medication 650 MILLIGRAM(S): at 20:00

## 2024-11-12 RX ADMIN — HEPARIN SODIUM 5000 UNIT(S): 10000 INJECTION INTRAVENOUS; SUBCUTANEOUS at 05:34

## 2024-11-12 RX ADMIN — Medication 650 MILLIGRAM(S): at 19:27

## 2024-11-12 RX ADMIN — EZETIMIBE 10 MILLIGRAM(S): 10 TABLET ORAL at 11:43

## 2024-11-12 RX ADMIN — AMOXICILLIN AND CLAVULANATE POTASSIUM 1 TABLET(S): 600; 42.9 POWDER, FOR SUSPENSION ORAL at 17:07

## 2024-11-12 RX ADMIN — PANTOPRAZOLE SODIUM 40 MILLIGRAM(S): 40 TABLET, DELAYED RELEASE ORAL at 05:34

## 2024-11-12 RX ADMIN — Medication 25 MILLIGRAM(S): at 11:43

## 2024-11-12 RX ADMIN — Medication 650 MILLIGRAM(S): at 13:44

## 2024-11-12 RX ADMIN — Medication 100 MILLILITER(S): at 11:43

## 2024-11-12 RX ADMIN — Medication 650 MILLIGRAM(S): at 21:48

## 2024-11-12 RX ADMIN — AMOXICILLIN AND CLAVULANATE POTASSIUM 1 TABLET(S): 600; 42.9 POWDER, FOR SUSPENSION ORAL at 05:34

## 2024-11-12 NOTE — PROGRESS NOTE ADULT - TIME BILLING
On this date of service, level of risk to patient is considered: Moderate.   I have personally seen and examined this patient.    I have reviewed all pertinent clinical information and reviewed all relevant imaging and diagnostic studies personally.   I discussed recommendations with the primary team. I discussed recommendations with the primary team.
On this date of service, level of risk to patient is considered: Moderate.     I have personally seen and examined this patient.    I have reviewed all pertinent clinical information and reviewed all relevant imaging and diagnostic studies personally.   I discussed recommendations with the primary team. I discussed recommendations with the primary team.

## 2024-11-12 NOTE — PROGRESS NOTE ADULT - SUBJECTIVE AND OBJECTIVE BOX
LINDA UPTON  [facility] 3I 002 A ([facility] 3I)            Patient was evaluated and examined  by bedside.                REVIEW OF SYSTEMS:  please see pertinent positives mentioned above, all other 12 ROS negative      T(C): , Max: 37 (11-12-24 @ 04:02)  HR: 101 (11-12-24 @ 04:02)  BP: 107/63 (11-12-24 @ 04:02)  RR: 16 (11-12-24 @ 04:02)  SpO2: 96% (11-12-24 @ 04:02)  CAPILLARY BLOOD GLUCOSE          PHYSICAL EXAM:  General: NAD  HEETN: NCAT  Lungs: scattered crackles throughout lung fields  Heart: irregular  Abdomen: Non-distended  Extremities: No edema        LAB  CBC  Date: 11-11-24 @ 06:52  Mean cell Tiyeqwgfqg80.3  Mean cell Hemoglobin Conc32.6  Mean cell Volum 83.7  Platelet count-Automate 468  RBC Count 3.37  Red Cell Distrib Width17.0  WBC Count12.86  % Albumin, Urine--  Hematocrit 28.2  Hemoglobin 9.2  CBC  Date: 11-10-24 @ 06:59  Mean cell Aogcfvwyqe99.3  Mean cell Hemoglobin Conc33.0  Mean cell Volum 82.7  Platelet count-Automate 475  RBC Count 3.81  Red Cell Distrib Width16.8  WBC Count15.43  % Albumin, Urine--  Hematocrit 31.5  Hemoglobin 10.4  CBC  Date: 11-09-24 @ 15:01  Mean cell Muhyunbsay03.3  Mean cell Hemoglobin Conc32.9  Mean cell Volum 83.0  Platelet count-Automate 459  RBC Count 3.95  Red Cell Distrib Width16.2  WBC Count16.11  % Albumin, Urine--  Hematocrit 32.8  Hemoglobin 10.8  CBC  Date: 11-09-24 @ 04:30  Mean cell Ddjgtesjvw58.2  Mean cell Hemoglobin Conc33.7  Mean cell Volum 80.7  Platelet count-Automate 378  RBC Count 3.42  Red Cell Distrib Width15.9  WBC Count15.27  % Albumin, Urine--  Hematocrit 27.6  Hemoglobin 9.3  CBC  Date: 11-08-24 @ 19:51  Mean cell Djbylezvbi97.8  Mean cell Hemoglobin Conc32.5  Mean cell Volum 82.6  Platelet count-Automate 381  RBC Count 2.87  Red Cell Distrib Width16.9  WBC Count15.52  % Albumin, Urine--  Hematocrit 23.7  Hemoglobin 7.7  CBC  Date: 11-08-24 @ 12:15  Mean cell Peobnbpjnd94.4  Mean cell Hemoglobin Conc32.9  Mean cell Volum 80.5  Platelet count-Automate 434  RBC Count 2.61  Red Cell Distrib Width17.4  WBC Count18.51  % Albumin, Urine--  Hematocrit 21.0  Hemoglobin 6.9    [facility]  11-11-24 @ 15:29  Blood Gas Arterial-Calcium,Ionized--  Blood Urea Nitrogen, Serum 59 mg/dL[H] [10 - 20]  Carbon Dioxide, Serum16 mmol/L[L] [17 - 32]  Chloride, Orham624 mmol/L [98 - 110]  Creatinie, Serum1.8 mg/dL[H] [0.7 - 1.5]  Glucose, Wmgmf350 mg/dL[H] [70 - 99]  Potassium, Serum3.8 mmol/L [3.5 - 5.0]  Sodium, Serum 139 mmol/L [135 - 146]  [facility]  11-11-24 @ 06:52  Blood Gas Arterial-Calcium,Ionized--  Blood Urea Nitrogen, Serum 58 mg/dL[H] [10 - 20]  Carbon Dioxide, Serum16 mmol/L[L] [17 - 32]  Chloride, Omzrg589 mmol/L [98 - 110]  Creatinie, Serum2.0 mg/dL[H] [0.7 - 1.5]  Glucose, Serum85 mg/dL [70 - 99]  Potassium, Serum3.8 mmol/L [3.5 - 5.0]  Sodium, Serum 141 mmol/L [135 - 146]  [facility]  11-10-24 @ 06:59  Blood Gas Arterial-Calcium,Ionized--  Blood Urea Nitrogen, Serum 62 mg/dL[HH] [10 - 20] [Critical value:]  Carbon Dioxide, Serum15 mmol/L[L] [17 - 32]  Chloride, Xgiok927 mmol/L [98 - 110]  Creatinie, Serum2.1 mg/dL[H] [0.7 - 1.5]  Glucose, Serum82 mg/dL [70 - 99]  Potassium, Serum4.1 mmol/L [3.5 - 5.0]  Sodium, Serum 138 mmol/L [135 - 146]  [facility]  11-09-24 @ 04:30  Blood Gas Arterial-Calcium,Ionized--  Blood Urea Nitrogen, Serum 60 mg/dL[H] [10 - 20]  Carbon Dioxide, Serum12 mmol/L[L] [17 - 32]  Chloride, Xjqys294 mmol/L [98 - 110]  Creatinie, Serum1.9 mg/dL[H] [0.7 - 1.5]  Glucose, Serum86 mg/dL [70 - 99]  Potassium, Serum4.2 mmol/L [3.5 - 5.0]  Sodium, Serum 136 mmol/L [135 - 146]  BMP  11-08-24 @ 12:15  Blood Gas Arterial-Calcium,Ionized--  Blood Urea Nitrogen, Serum 58 mg/dL[H] [10 - 20]  Carbon Dioxide, Serum14 mmol/L[L] [17 - 32]  Chloride, Kbtjw171 mmol/L [98 - 110]  Creatinie, Serum2.1 mg/dL[H] [0.7 - 1.5]  Glucose, Tzoqz992 mg/dL[H] [70 - 99]  Potassium, Serum4.4 mmol/L [3.5 - 5.0]  Sodium, Serum 132 mmol/L[L] [135 - 146]        PT/INR - ( 10 Nov 2024 06:59 )   PT: 12.10 sec;   INR: 1.02 ratio         PTT - ( 10 Nov 2024 06:59 )  PTT:27.2 sec      Microbiology:    Culture - Urine (collected 11-08-24 @ 20:09)  Source: Clean Catch Clean Catch (Midstream)  Final Report (11-10-24 @ 11:15):    <10,000 CFU/mL Normal Urogenital Nusrat    Culture - Blood (collected 11-08-24 @ 19:51)  Source: .Blood BLOOD  Preliminary Report (11-12-24 @ 02:01):    No growth at 72 Hours        RADIOLOGY & ADDITIONAL TESTS:        Medications:  acetaminophen     Tablet .. 650 milliGRAM(s) Oral every 6 hours PRN  amoxicillin  500 milliGRAM(s)/clavulanate 1 Tablet(s) Oral two times a day  ezetimibe 10 milliGRAM(s) Oral daily  ferrous    sulfate 325 milliGRAM(s) Oral daily  guaifenesin/dextromethorphan Oral Liquid 10 milliLiter(s) Oral four times a day PRN  heparin   Injectable 5000 Unit(s) SubCutaneous every 12 hours  metoprolol tartrate 12.5 milliGRAM(s) Oral every 8 hours  pantoprazole    Tablet 40 milliGRAM(s) Oral before breakfast  sodium bicarbonate 650 milliGRAM(s) Oral three times a day        Assessment and Plan:      84F with PMH notable for hypetension, hyperlipidemia,  chronic anemia, frequent ED visits for anemia requiring transfusions, presenting from nursing home after found to have Hb of 5.8, also found to have CAP and new onset atrial fibrillation    #Community acquired pneumonia  #Complex parapneumonic effusion  Minimal symptoms, with dry cough for 1 week, labs notable for leukocytosis up to 18k, patient remarkably afebrile, suspect due to immunosenescence, on RA currently. CT with LLL opacity with associated loculated parapneumonic effusion  -Status post ceftriaxone/azithromycin (11/8)-->switch to Unasyn 3gr q12 hours as per ID recs, today is day 4 of antibiotics. Patient switched to oral Augmentin 500/125 to complete 10 days of oral treatment.  -COVID-19/Flu/RSV-->negative  -Pulm consult for diagnostic thora-->recommending IR consultation given loculated effusion. Discussed with IR, no need for thoracentesis currently given small amount of effusion. Continue medical management    #Symptomatic anemia  #Anemia of chronic disease  Reports that she has anemia for three years now and has undergone extensive workup including C-scope 3 years ago which was unremarkable. Has chronically dark stool due to iron tablets. Denies any bleeding. It appears that her Hb has downtrended over the years from 10-->9-->7-8 currently. DDx includes occult chronic GI bleeding leading to EVER, however at that age, MM and MDS also need to be considered  -Follow iron studies, B12, folate-->B12/folate adequate, iron studies with low TIBC and ferritin>1000 consistent with anemia of chronic disease  -Follow FOBT-->negative samples so far  -Follow SPEP/immunofixation, overall family not interested inaggressive measures  -Heme/onc consulted appreciate recs-->patient would need bone marrow biopsy to rule out MDS but not within GOC.  Patient to continue Retacrit at 10,000 units weekly, she is due for it on Wednesdays  -GI consulted appreciate recs, monitor for clinical signs of bleeding for now, no plans for inpatient scope    #KINGA on CKD  -Improved with fluids  -Start sodium bicarbonate 650mg TID as patient's baseline bicarb <22 for  -Overall appears euvolemic so cardiorenal less likely      #Atrial fibrillation, new onset  -Likely in the setting of anemia and infection  -Patient currently rate controlled, continue metop 12.5mg TID  -Likely associated CHF, received two doses of Lasix with PRBC, will hold off on further Lasix as CT without clear evidence of pulmonary edema, mainly PNA noted and patient also with soft BP   -TTE ordered and pending-->patient refused  -No indication for AC as per cards currently and with anemia requiring frequent transfusions  -Cards consulted appreciate recs    #NSTEMI type 2 secondary to demand ischemia  -Trops peaked and downtrended, stop checking    Code: DNR/DNI ok for NIV  PPx: heparin  Dispo: Anticipated discharge to [facility] long term today         LINDA UPTON  [facility] 3I 002 A ([facility]-S 3I)            Patient was evaluated and examined  by bedside.                REVIEW OF SYSTEMS:  please see pertinent positives mentioned above, all other 12 ROS negative      T(C): , Max: 37 (11-12-24 @ 04:02)  HR: 101 (11-12-24 @ 04:02)  BP: 107/63 (11-12-24 @ 04:02)  RR: 16 (11-12-24 @ 04:02)  SpO2: 96% (11-12-24 @ 04:02)  CAPILLARY BLOOD GLUCOSE          PHYSICAL EXAM:  General: NAD  HEETN: NCAT  Lungs: scattered crackles throughout lung fields  Heart: irregular  Abdomen: Non-distended  Extremities: No edema        LAB  CBC  Date: 11-11-24 @ 06:52  Mean cell Yjcdfbmejb36.3  Mean cell Hemoglobin Conc32.6  Mean cell Volum 83.7  Platelet count-Automate 468  RBC Count 3.37  Red Cell Distrib Width17.0  WBC Count12.86  % Albumin, Urine--  Hematocrit 28.2  Hemoglobin 9.2  CBC  Date: 11-10-24 @ 06:59  Mean cell Aloilhnwij94.3  Mean cell Hemoglobin Conc33.0  Mean cell Volum 82.7  Platelet count-Automate 475  RBC Count 3.81  Red Cell Distrib Width16.8  WBC Count15.43  % Albumin, Urine--  Hematocrit 31.5  Hemoglobin 10.4  CBC  Date: 11-09-24 @ 15:01  Mean cell Itvcviwcat73.3  Mean cell Hemoglobin Conc32.9  Mean cell Volum 83.0  Platelet count-Automate 459  RBC Count 3.95  Red Cell Distrib Width16.2  WBC Count16.11  % Albumin, Urine--  Hematocrit 32.8  Hemoglobin 10.8  CBC  Date: 11-09-24 @ 04:30  Mean cell Nztsfvshha38.2  Mean cell Hemoglobin Conc33.7  Mean cell Volum 80.7  Platelet count-Automate 378  RBC Count 3.42  Red Cell Distrib Width15.9  WBC Count15.27  % Albumin, Urine--  Hematocrit 27.6  Hemoglobin 9.3  CBC  Date: 11-08-24 @ 19:51  Mean cell Sktpkozqtn89.8  Mean cell Hemoglobin Conc32.5  Mean cell Volum 82.6  Platelet count-Automate 381  RBC Count 2.87  Red Cell Distrib Width16.9  WBC Count15.52  % Albumin, Urine--  Hematocrit 23.7  Hemoglobin 7.7  CBC  Date: 11-08-24 @ 12:15  Mean cell Rjkwonaaft50.4  Mean cell Hemoglobin Conc32.9  Mean cell Volum 80.5  Platelet count-Automate 434  RBC Count 2.61  Red Cell Distrib Width17.4  WBC Count18.51  % Albumin, Urine--  Hematocrit 21.0  Hemoglobin 6.9    [facility]  11-11-24 @ 15:29  Blood Gas Arterial-Calcium,Ionized--  Blood Urea Nitrogen, Serum 59 mg/dL[H] [10 - 20]  Carbon Dioxide, Serum16 mmol/L[L] [17 - 32]  Chloride, Hxlub282 mmol/L [98 - 110]  Creatinie, Serum1.8 mg/dL[H] [0.7 - 1.5]  Glucose, Srnem790 mg/dL[H] [70 - 99]  Potassium, Serum3.8 mmol/L [3.5 - 5.0]  Sodium, Serum 139 mmol/L [135 - 146]  [facility]  11-11-24 @ 06:52  Blood Gas Arterial-Calcium,Ionized--  Blood Urea Nitrogen, Serum 58 mg/dL[H] [10 - 20]  Carbon Dioxide, Serum16 mmol/L[L] [17 - 32]  Chloride, Dlqig056 mmol/L [98 - 110]  Creatinie, Serum2.0 mg/dL[H] [0.7 - 1.5]  Glucose, Serum85 mg/dL [70 - 99]  Potassium, Serum3.8 mmol/L [3.5 - 5.0]  Sodium, Serum 141 mmol/L [135 - 146]  [facility]  11-10-24 @ 06:59  Blood Gas Arterial-Calcium,Ionized--  Blood Urea Nitrogen, Serum 62 mg/dL[HH] [10 - 20] [Critical value:]  Carbon Dioxide, Serum15 mmol/L[L] [17 - 32]  Chloride, Nfshv120 mmol/L [98 - 110]  Creatinie, Serum2.1 mg/dL[H] [0.7 - 1.5]  Glucose, Serum82 mg/dL [70 - 99]  Potassium, Serum4.1 mmol/L [3.5 - 5.0]  Sodium, Serum 138 mmol/L [135 - 146]  [facility]  11-09-24 @ 04:30  Blood Gas Arterial-Calcium,Ionized--  Blood Urea Nitrogen, Serum 60 mg/dL[H] [10 - 20]  Carbon Dioxide, Serum12 mmol/L[L] [17 - 32]  Chloride, Hdmxj882 mmol/L [98 - 110]  Creatinie, Serum1.9 mg/dL[H] [0.7 - 1.5]  Glucose, Serum86 mg/dL [70 - 99]  Potassium, Serum4.2 mmol/L [3.5 - 5.0]  Sodium, Serum 136 mmol/L [135 - 146]  [facility]MP  11-08-24 @ 12:15  Blood Gas Arterial-Calcium,Ionized--  Blood Urea Nitrogen, Serum 58 mg/dL[H] [10 - 20]  Carbon Dioxide, Serum14 mmol/L[L] [17 - 32]  Chloride, Lhbpv849 mmol/L [98 - 110]  Creatinie, Serum2.1 mg/dL[H] [0.7 - 1.5]  Glucose, Lsgdh238 mg/dL[H] [70 - 99]  Potassium, Serum4.4 mmol/L [3.5 - 5.0]  Sodium, Serum 132 mmol/L[L] [135 - 146]        PT/INR - ( 10 Nov 2024 06:59 )   PT: 12.10 sec;   INR: 1.02 ratio         PTT - ( 10 Nov 2024 06:59 )  PTT:27.2 sec      Microbiology:    Culture - Urine (collected 11-08-24 @ 20:09)  Source: Clean Catch Clean Catch (Midstream)  Final Report (11-10-24 @ 11:15):    <10,000 CFU/mL Normal Urogenital Nusrat    Culture - Blood (collected 11-08-24 @ 19:51)  Source: .Blood BLOOD  Preliminary Report (11-12-24 @ 02:01):    No growth at 72 Hours        RADIOLOGY & ADDITIONAL TESTS:        Medications:  acetaminophen     Tablet .. 650 milliGRAM(s) Oral every 6 hours PRN  amoxicillin  500 milliGRAM(s)/clavulanate 1 Tablet(s) Oral two times a day  ezetimibe 10 milliGRAM(s) Oral daily  ferrous    sulfate 325 milliGRAM(s) Oral daily  guaifenesin/dextromethorphan Oral Liquid 10 milliLiter(s) Oral four times a day PRN  heparin   Injectable 5000 Unit(s) SubCutaneous every 12 hours  metoprolol tartrate 12.5 milliGRAM(s) Oral every 8 hours  pantoprazole    Tablet 40 milliGRAM(s) Oral before breakfast  sodium bicarbonate 650 milliGRAM(s) Oral three times a day        Assessment and Plan:      84F with PMH notable for hypetension, hyperlipidemia,  chronic anemia, frequent ED visits for anemia requiring transfusions, presenting from nursing home after found to have Hb of 5.8, also found to have CAP and new onset atrial fibrillation    #Community acquired pneumonia  #Complex parapneumonic effusion  Minimal symptoms, with dry cough for 1 week, labs notable for leukocytosis up to 18k, patient remarkably afebrile, suspect due to immunosenescence, on RA currently. CT with LLL opacity with associated loculated parapneumonic effusion  -Status post ceftriaxone/azithromycin (11/8)-->switched to Unasyn 3gr q12 hours as per ID recs, today is day 5 of antibiotics. Patient switched to oral Augmentin 500/125 on 11/11 to complete 10 days of oral treatment given parapneumonic loculated effusion.  -COVID-19/Flu/RSV-->negative  -Pulm consult for diagnostic thora-->recommending IR consultation given loculated effusion. Discussed with IR, no need for thoracentesis currently given small amount of effusion. Continue medical management    #Symptomatic anemia  #Anemia of chronic disease  Reports that she has anemia for three years now and has undergone extensive workup including C-scope 3 years ago which was unremarkable. Has chronically dark stool due to iron tablets. Denies any bleeding. It appears that her Hb has downtrended over the years from 10-->9-->7-8 currently. DDx includes occult chronic GI bleeding leading to EVER, however at that age, MM and MDS also need to be considered  -Follow iron studies, B12, folate-->B12/folate adequate, iron studies with low TIBC and ferritin>1000 consistent with anemia of chronic disease  -Follow FOBT-->negative samples so far  -Follow SPEP/immunofixation, overall family not interested inaggressive measures  -Heme/onc consulted appreciate recs-->patient would need bone marrow biopsy to rule out MDS but not within GOC.  Patient to continue Retacrit at 10,000 units weekly, she is due for it on Wednesdays  -GI consulted appreciate recs, monitor for clinical signs of bleeding for now, no plans for inpatient scope    #KINGA on CKD  -Improved with fluids  -Start sodium bicarbonate 650mg TID as patient's baseline bicarb <22 i/s/o CKD. Patient ot continue as an outpatient.  -Overall appears euvolemic so cardiorenal less likely      #Atrial fibrillation, new onset  -Likely in the setting of anemia and infection  -Patient's HR persistenty in the 100-110s with increasing metoprolol from 12.5 BID to TID, increase to 25mg BID today. Target HR <100 consistently in order to safely d/c to SNF  -Likely associated CHF, received two doses of Lasix with PRBC, will hold off on further Lasix as CT without clear evidence of pulmonary edema, mainly PNA noted and patient also with soft BP   -TTE ordered and pending-->patient refused to have TTE, acknowledges risks  -No indication for AC as per cards currently and with anemia requiring frequent transfusions  -Cards consulted appreciate recs    #NSTEMI type 2 secondary to demand ischemia  -Trops peaked and downtrended, stop checking    Code: DNR/DNI ok for NIV  PPx: heparin  Dispo: Anticipated discharge to [facility] when HR better controlled         LINDA UPTON  [facility] 3I 002 A ([facility]-S 3I)            Patient was evaluated and examined  by bedside.                REVIEW OF SYSTEMS:  please see pertinent positives mentioned above, all other 12 ROS negative      T(C): , Max: 37 (11-12-24 @ 04:02)  HR: 101 (11-12-24 @ 04:02)  BP: 107/63 (11-12-24 @ 04:02)  RR: 16 (11-12-24 @ 04:02)  SpO2: 96% (11-12-24 @ 04:02)  CAPILLARY BLOOD GLUCOSE          PHYSICAL EXAM:  General: NAD  HEETN: NCAT  Lungs: scattered crackles throughout lung fields  Heart: irregular  Abdomen: Non-distended  Extremities: No edema        LAB  CBC  Date: 11-11-24 @ 06:52  Mean cell Hkoltualng56.3  Mean cell Hemoglobin Conc32.6  Mean cell Volum 83.7  Platelet count-Automate 468  RBC Count 3.37  Red Cell Distrib Width17.0  WBC Count12.86  % Albumin, Urine--  Hematocrit 28.2  Hemoglobin 9.2  CBC  Date: 11-10-24 @ 06:59  Mean cell Xjxbedmybu62.3  Mean cell Hemoglobin Conc33.0  Mean cell Volum 82.7  Platelet count-Automate 475  RBC Count 3.81  Red Cell Distrib Width16.8  WBC Count15.43  % Albumin, Urine--  Hematocrit 31.5  Hemoglobin 10.4  CBC  Date: 11-09-24 @ 15:01  Mean cell Vzuahavkzh64.3  Mean cell Hemoglobin Conc32.9  Mean cell Volum 83.0  Platelet count-Automate 459  RBC Count 3.95  Red Cell Distrib Width16.2  WBC Count16.11  % Albumin, Urine--  Hematocrit 32.8  Hemoglobin 10.8  CBC  Date: 11-09-24 @ 04:30  Mean cell Rrmkjrhdkz10.2  Mean cell Hemoglobin Conc33.7  Mean cell Volum 80.7  Platelet count-Automate 378  RBC Count 3.42  Red Cell Distrib Width15.9  WBC Count15.27  % Albumin, Urine--  Hematocrit 27.6  Hemoglobin 9.3  CBC  Date: 11-08-24 @ 19:51  Mean cell Noaguowihg33.8  Mean cell Hemoglobin Conc32.5  Mean cell Volum 82.6  Platelet count-Automate 381  RBC Count 2.87  Red Cell Distrib Width16.9  WBC Count15.52  % Albumin, Urine--  Hematocrit 23.7  Hemoglobin 7.7  CBC  Date: 11-08-24 @ 12:15  Mean cell Bdmbbdvnsi29.4  Mean cell Hemoglobin Conc32.9  Mean cell Volum 80.5  Platelet count-Automate 434  RBC Count 2.61  Red Cell Distrib Width17.4  WBC Count18.51  % Albumin, Urine--  Hematocrit 21.0  Hemoglobin 6.9    [facility]  11-11-24 @ 15:29  Blood Gas Arterial-Calcium,Ionized--  Blood Urea Nitrogen, Serum 59 mg/dL[H] [10 - 20]  Carbon Dioxide, Serum16 mmol/L[L] [17 - 32]  Chloride, Rpgwh237 mmol/L [98 - 110]  Creatinie, Serum1.8 mg/dL[H] [0.7 - 1.5]  Glucose, Xkolp793 mg/dL[H] [70 - 99]  Potassium, Serum3.8 mmol/L [3.5 - 5.0]  Sodium, Serum 139 mmol/L [135 - 146]  [facility]  11-11-24 @ 06:52  Blood Gas Arterial-Calcium,Ionized--  Blood Urea Nitrogen, Serum 58 mg/dL[H] [10 - 20]  Carbon Dioxide, Serum16 mmol/L[L] [17 - 32]  Chloride, Gcybu360 mmol/L [98 - 110]  Creatinie, Serum2.0 mg/dL[H] [0.7 - 1.5]  Glucose, Serum85 mg/dL [70 - 99]  Potassium, Serum3.8 mmol/L [3.5 - 5.0]  Sodium, Serum 141 mmol/L [135 - 146]  [facility]  11-10-24 @ 06:59  Blood Gas Arterial-Calcium,Ionized--  Blood Urea Nitrogen, Serum 62 mg/dL[HH] [10 - 20] [Critical value:]  Carbon Dioxide, Serum15 mmol/L[L] [17 - 32]  Chloride, Ovqop379 mmol/L [98 - 110]  Creatinie, Serum2.1 mg/dL[H] [0.7 - 1.5]  Glucose, Serum82 mg/dL [70 - 99]  Potassium, Serum4.1 mmol/L [3.5 - 5.0]  Sodium, Serum 138 mmol/L [135 - 146]  [facility]  11-09-24 @ 04:30  Blood Gas Arterial-Calcium,Ionized--  Blood Urea Nitrogen, Serum 60 mg/dL[H] [10 - 20]  Carbon Dioxide, Serum12 mmol/L[L] [17 - 32]  Chloride, Wkphl337 mmol/L [98 - 110]  Creatinie, Serum1.9 mg/dL[H] [0.7 - 1.5]  Glucose, Serum86 mg/dL [70 - 99]  Potassium, Serum4.2 mmol/L [3.5 - 5.0]  Sodium, Serum 136 mmol/L [135 - 146]  BMP  11-08-24 @ 12:15  Blood Gas Arterial-Calcium,Ionized--  Blood Urea Nitrogen, Serum 58 mg/dL[H] [10 - 20]  Carbon Dioxide, Serum14 mmol/L[L] [17 - 32]  Chloride, Xyzbm243 mmol/L [98 - 110]  Creatinie, Serum2.1 mg/dL[H] [0.7 - 1.5]  Glucose, Ubqzx272 mg/dL[H] [70 - 99]  Potassium, Serum4.4 mmol/L [3.5 - 5.0]  Sodium, Serum 132 mmol/L[L] [135 - 146]        PT/INR - ( 10 Nov 2024 06:59 )   PT: 12.10 sec;   INR: 1.02 ratio         PTT - ( 10 Nov 2024 06:59 )  PTT:27.2 sec      Microbiology:    Culture - Urine (collected 11-08-24 @ 20:09)  Source: Clean Catch Clean Catch (Midstream)  Final Report (11-10-24 @ 11:15):    <10,000 CFU/mL Normal Urogenital Nusrat    Culture - Blood (collected 11-08-24 @ 19:51)  Source: .Blood BLOOD  Preliminary Report (11-12-24 @ 02:01):    No growth at 72 Hours        RADIOLOGY & ADDITIONAL TESTS:        Medications:  acetaminophen     Tablet .. 650 milliGRAM(s) Oral every 6 hours PRN  amoxicillin  500 milliGRAM(s)/clavulanate 1 Tablet(s) Oral two times a day  ezetimibe 10 milliGRAM(s) Oral daily  ferrous    sulfate 325 milliGRAM(s) Oral daily  guaifenesin/dextromethorphan Oral Liquid 10 milliLiter(s) Oral four times a day PRN  heparin   Injectable 5000 Unit(s) SubCutaneous every 12 hours  metoprolol tartrate 12.5 milliGRAM(s) Oral every 8 hours  pantoprazole    Tablet 40 milliGRAM(s) Oral before breakfast  sodium bicarbonate 650 milliGRAM(s) Oral three times a day        Assessment and Plan:      84F with PMH notable for hypetension, hyperlipidemia,  chronic anemia, frequent ED visits for anemia requiring transfusions, presenting from nursing home after found to have Hb of 5.8, also found to have CAP and new onset atrial fibrillation    #Community acquired pneumonia  #Complex parapneumonic effusion  Minimal symptoms, with dry cough for 1 week, labs notable for leukocytosis up to 18k, patient remarkably afebrile, suspect due to immunosenescence, on RA currently. CT with LLL opacity with associated loculated parapneumonic effusion  -Status post ceftriaxone/azithromycin (11/8)-->switched to Unasyn 3gr q12 hours as per ID recs, today is day 5 of antibiotics. Patient switched to oral Augmentin 500/125 on 11/11 to complete 10 days of oral treatment given parapneumonic loculated effusion.  -COVID-19/Flu/RSV-->negative  -Pulm consult for diagnostic thora-->recommending IR consultation given loculated effusion. Discussed with IR, no need for thoracentesis currently given small amount of effusion. Continue medical management    #Symptomatic anemia  #Anemia of chronic disease  Reports that she has anemia for three years now and has undergone extensive workup including C-scope 3 years ago which was unremarkable. Has chronically dark stool due to iron tablets. Denies any bleeding. It appears that her Hb has downtrended over the years from 10-->9-->7-8 currently. DDx includes occult chronic GI bleeding leading to EVER, however at that age, MM and MDS also need to be considered  -Follow iron studies, B12, folate-->B12/folate adequate, iron studies with low TIBC and ferritin>1000 consistent with anemia of chronic disease  -Follow FOBT-->negative samples so far  -Follow SPEP/immunofixation, overall family not interested inaggressive measures  -Heme/onc consulted appreciate recs-->patient would need bone marrow biopsy to rule out MDS but not within GOC.  Patient to continue Retacrit at 10,000 units weekly, she is due for it on Wednesdays  -GI consulted appreciate recs, monitor for clinical signs of bleeding for now, no plans for inpatient scope    #KINGA on CKD  -Improved with fluids  -Start sodium bicarbonate 650mg TID as patient's baseline bicarb <22 i/s/o CKD. Patient ot continue as an outpatient.  -Overall appears euvolemic so cardiorenal less likely      #Atrial fibrillation, new onset  -Likely in the setting of anemia and infection  -Patient's HR persistenty in the 100-110s with increasing metoprolol from 12.5 BID to TID, increase to 25mg BID today. Target HR <100 consistently in order to safely d/c to SNF  -Likely associated CHF, received two doses of Lasix with PRBC, will hold off on further Lasix as CT without clear evidence of pulmonary edema, mainly PNA noted and patient also with soft BP   -TTE ordered and pending-->patient refused to have TTE, acknowledges risks  -No indication for AC as per cards currently and with anemia requiring frequent transfusions  -Cards consulted appreciate recs    #NSTEMI type 2 secondary to demand ischemia  -Trops peaked and downtrended, stop checking    Handoff: D/c ready to SNF ([facility]) pending Afib HR control    Code: DNR/DNI ok for NIV  PPx: heparin  Dispo: Anticipated discharge to [facility] when HR better controlled

## 2024-11-12 NOTE — PROGRESS NOTE ADULT - ASSESSMENT
This is a 84 year old female with PMH of HTN, HLD, chronic anemia presents after she was found to have low hemoglobin at [facility].    IMPRESSION  #Leukocytosis- Likely reactive from anemia   #CT showing left lower lobe consolidation with underlying parapneumonic effusion. Possibly partially loculated.   Clinically no bacterial pneumonia. Possibility of chronic aspiration vs underlying undiagnosed malignancy?  #Acute on chronic anemia-Perhaps MDS?   #Elevated Troponin, possible cardiomyopathy.   Getting iron infusion? 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  Flu/RSV/Covid negative    RECOMMENDATIONS  -Pulmonary and IR evaluation noted. Patient also not agreeable for further interventions.   -IV Unasyn 3 gram q 12 hours >> transitioned to PO Augmentin 500-125 BID for total of 10 days from 11/9.  -Off loading to prevent pressure sores and preventive measures to avoid aspiration  -Guarded prognosis. GOC.   -Can be advised for RSV and PSV 20 vaccines.     If any questions, please send a message or call on [employer] Teams  Please continue to update ID with any pertinent new laboratory or radiographic findings.    Nabeel Lopez M.D  Infectious Diseases Attending/   [employer] [facility] [place]     This is a 84 year old female with PMH of HTN, HLD, chronic anemia presents after she was found to have low hemoglobin at [facility].    IMPRESSION  #Leukocytosis- Likely reactive from anemia   #CT showing left lower lobe consolidation with underlying parapneumonic effusion. Possibly partially loculated.   Clinically no bacterial pneumonia. Possibility of chronic aspiration vs underlying undiagnosed malignancy?  #Acute on chronic anemia-Perhaps MDS?   #Elevated Troponin, possible cardiomyopathy.   Getting iron infusion? 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  Flu/RSV/Covid negative    RECOMMENDATIONS  -Pulmonary and IR evaluation noted. Patient also not agreeable for further interventions.   -IV Unasyn 3 gram q 12 hours >> transitioned to PO Augmentin 500-125 BID for total of 10 days from 11/9.  -Off loading to prevent pressure sores and preventive measures to avoid aspiration.  -Guarded prognosis. GOC.   -Can be advised for RSV and PSV 20 vaccines.     If any questions, please send a message or call on [employer] Teams  Please continue to update ID with any pertinent new laboratory or radiographic findings.    Nabeel Lopez M.D  Infectious Diseases Attending/   [employer] at [facility]

## 2024-11-12 NOTE — PROGRESS NOTE ADULT - REASON FOR ADMISSION
symptomatic anemia, new onset afib

## 2024-11-12 NOTE — PROGRESS NOTE ADULT - SUBJECTIVE AND OBJECTIVE BOX
LINDA UPTON  84y, Female    LOS  4d    INTERVAL EVENTS/HPI  - No acute events overnight  - T(F): , Max: 98.6 (11-12-24 @ 04:02)  - WBC Count: 12.86 (11-11-24 @ 06:52)  WBC Count: 15.43 (11-10-24 @ 06:59)  - Creatinine: 1.9 (11-12-24 @ 06:14)  Creatinine: 1.8 (11-11-24 @ 15:29)    REVIEW OF SYSTEMS:  CONSTITUTIONAL: No fever or chills  HEENT: No sore throat  RESPIRATORY: No cough, no shortness of breath  CARDIOVASCULAR: No chest pain or palpitations  GASTROINTESTINAL: No abdominal or epigastric pain  GENITOURINARY: No dysuria  NEUROLOGICAL: No headache/dizziness  MSK: No joint pain, erythema, or swelling; no back pain  SKIN: No itching, rashes  All other ROS negative except noted above    Prior hospital charts reviewed [Yes]  Primary team notes reviewed [Yes]  Other consultant notes reviewed [Yes]    ANTIMICROBIALS:   amoxicillin  500 milliGRAM(s)/clavulanate 1 two times a day      OTHER MEDS: MEDICATIONS  (STANDING):  acetaminophen     Tablet .. 650 every 6 hours PRN  ezetimibe 10 daily  guaifenesin/dextromethorphan Oral Liquid 10 four times a day PRN  heparin   Injectable 5000 every 12 hours  metoprolol tartrate 12.5 every 8 hours  pantoprazole    Tablet 40 before breakfast      Vital Signs Last 24 Hrs  T(F): 98.6 (11-12-24 @ 04:02), Max: 98.9 (11-09-24 @ 05:00)    Vital Signs Last 24 Hrs  HR: 101 (11-12-24 @ 04:02) (56 - 101)  BP: 107/63 (11-12-24 @ 04:02) (90/57 - 107/63)  RR: 16 (11-12-24 @ 04:02)  SpO2: 96% (11-12-24 @ 04:02) (96% - 96%)  Wt(kg): --    EXAM:  GENERAL: NAD, frail looking.   HEAD: No head lesions  NECK: Supple  CHEST/LUNG: Shallow breath sounds.   HEART: S1 S2  ABDOMEN: Soft, nontender  EXTREMITIES: No clubbing. + PICC line in the right arm.   NERVOUS SYSTEM: Alert and oriented to person  MSK: No joint erythema, swelling or pain  SKIN: No rashes or lesions, no superficial thrombophlebitis    Labs:                        9.2    12.86 )-----------( 468      ( 11 Nov 2024 06:52 )             28.2     11-12    141  |  110  |  55[H]  ----------------------------<  96  4.0   |  17  |  1.9[H]    Ca    8.1[L]      12 Nov 2024 06:14  Phos  4.2     11-11  Mg     1.9     11-11    TPro  5.6[L]  /  Alb  2.7[L]  /  TBili  0.3  /  DBili  x   /  AST  24  /  ALT  20  /  AlkPhos  115  11-11      WBC Trend:  WBC Count: 12.86 (11-11-24 @ 06:52)  WBC Count: 15.43 (11-10-24 @ 06:59)  WBC Count: 16.11 (11-09-24 @ 15:01)  WBC Count: 15.27 (11-09-24 @ 04:30)      Creatine Trend:  Creatinine: 1.9 (11-12)  Creatinine: 1.8 (11-11)  Creatinine: 2.0 (11-11)  Creatinine: 2.1 (11-10)      Liver Biochemical Testing Trend:  Alanine Aminotransferase (ALT/SGPT): 20 (11-11)  Alanine Aminotransferase (ALT/SGPT): 20 (11-10)  Alanine Aminotransferase (ALT/SGPT): 20 (11-09)  Alanine Aminotransferase (ALT/SGPT): 25 (11-08)  Alanine Aminotransferase (ALT/SGPT): 10 (10-24)  Aspartate Aminotransferase (AST/SGOT): 24 (11-11-24 @ 06:52)  Aspartate Aminotransferase (AST/SGOT): 28 (11-10-24 @ 06:59)  Aspartate Aminotransferase (AST/SGOT): 30 (11-09-24 @ 04:30)  Aspartate Aminotransferase (AST/SGOT): 43 (11-08-24 @ 12:15)  Aspartate Aminotransferase (AST/SGOT): 18 (10-24-24 @ 12:55)  Bilirubin Total: 0.3 (11-11)  Bilirubin Total: 0.3 (11-10)  Bilirubin Total: 0.3 (11-09)  Bilirubin Total: <0.2 (11-08)  Bilirubin Total: 0.3 (10-24)      Trend LDH      Urinalysis Basic - ( 12 Nov 2024 06:14 )    Color: x / Appearance: x / SG: x / pH: x  Gluc: 96 mg/dL / Ketone: x  / Bili: x / Urobili: x   Blood: x / Protein: x / Nitrite: x   Leuk Esterase: x / RBC: x / WBC x   Sq Epi: x / Non Sq Epi: x / Bacteria: x        MICROBIOLOGY:    Female    Culture - Urine (collected 08 Nov 2024 20:09)  Source: Clean Catch Clean Catch (Midstream)  Final Report:    <10,000 CFU/mL Normal Urogenital Nusrat    Culture - Blood (collected 08 Nov 2024 19:51)  Source: .Blood BLOOD  Preliminary Report:    No growth at 72 Hours      Legionella Antigen, Urine: Negative (11-08 @ 20:09)    Ferritin: 1356 (11-09)  Troponin T, High Sensitivity Result: 825 (11-08)  Troponin T, High Sensitivity Result: 988 (11-08)  Troponin T, High Sensitivity Result: 965 (11-08)          RADIOLOGY & ADDITIONAL TESTS:  I have personally reviewed the relevant images.   CXR      CT  CT Chest No Cont:   ACC: [id number] EXAM:  CT CHEST   ORDERED BY: STEVEN STOVER     PROCEDURE DATE:  11/09/2024          INTERPRETATION:  CLINICAL INFORMATION: Evaluation for pneumonia.    COMPARISON: None.    CONTRAST/COMPLICATIONS:  IV Contrast: NONE  Oral Contrast: NONE  Complications: None reported at time of study completion    PROCEDURE:  CT of the Chest was performed.  Sagittal and coronal reformats were performed.    FINDINGS:    LUNGS/AIRWAYS/PLEURA: Trachea is clear. Subpleural reticulations possibly   related to scarring or fibrotic changes. Right lower lobe dependent   subsegmental atelectasis. Left lower lobe consolidation with air   bronchograms compatible with pneumonia. Underlying parapneumonic   effusion, possibly partially loculated.  VESSELS: Aortic and coronary artery and branch vessel atherosclerosis.  HEART: Cardiomegaly. Mitral annulus calcifications. Aortic valve   calcifications. Coronary artery calcifications. Trace fluid in the   superior pericardial recess  MEDIASTINUM AND DIEGO: No lymphadenopathy.  CHEST WALL AND LOWER NECK: Within normal limits.  VISUALIZED UPPER ABDOMEN: No focal abnormality.  BONES: Diffusely sclerotic bones, nonspecific can be seen with renal   osteodystrophy. Degenerative changes along the vertebral column.   Bilateral shoulder osteoarthritic changes more pronounced on the right.    IMPRESSION:  1.  Left lower lobe pneumonia with underlying parapneumonic effusion,   possibly partially loculated along the upper lung field.  2.  Diffusely sclerotic vertebral column, nonspecific possibly related to   renal osteodystrophy if there is a history of chronic renal disease.    --- End of Report ---    STORMY SIBLEY MD; Attending Radiologist  This document has been electronically signed. Nov 9 2024  9:39AM (11-09-24 @ 09:29)        WEIGHT  Weight (kg): 61.2 (11-08-24 @ 11:35)  Creatinine: 1.9 mg/dL (11-12-24 @ 06:14)  Creatinine: 1.8 mg/dL (11-11-24 @ 15:29)      All available historical records have been reviewed

## 2024-11-12 NOTE — CHART NOTE - NSCHARTNOTEFT_GEN_A_CORE
Spoke with pt and daughter Paula at b/s regarding risks of aspiration and recommendation for instrumental swallow study to further assess swallow physiology and risk for aspiration. Pt + daughter declining study at this time. Pt stated "no more tests". Recommend continue current diet as tolerated, maintain aspiration precautions. SLP to sign off. Reconsult PRN.
called by ED to evaluate. patient was seen and examined bedside with her daughter Paula present. at the time of my examination, patient is in no acute distress, receiving blood transfusion through the IV, and she denies any active chest pain or SOB. She was sating well on RA, hemodynamically stable, with HR ranging from 95 to 105. lab work, imaging reviewed. troponin and EKG noted.   any updates please reach out to the daughter Paula at [phone]  pt does not require ICU level of care at this time. please recall PRN.
Patient slightly hypotensive, asymptomatic, MAP>65. Will give a small bolus of 250 LR. HR currently controlled <110. Hold off on beta blocker for now (although small dose, less likely contributing) and also in case patient will need pressors. Will keep monitoring for now.
Trop trending down, asymptomatic.  EKG shows no acute ischemic changes.  discussed case with ICU, who recommended to monitor trop  keep HBG> 8 as per primary hospitalist, HBG 7.7, will transfuse another unit of RBC + Lasix  cardiology consult noticed
Was called from ED for admission of this patient to medicine. Troponin noted significantly elevated at 965 with non-specific TWIs. Asked ED to repeat troponin before accepting patient to the telemetry floor. If repeat trop significantly higher than prior will need to consider step-down for closer monitoring. Awaiting for repeat troponin before admission to medicine.

## 2024-11-13 ENCOUNTER — TRANSCRIPTION ENCOUNTER (OUTPATIENT)
Age: 84
End: 2024-11-13

## 2024-11-13 VITALS
TEMPERATURE: 98 F | HEART RATE: 101 BPM | SYSTOLIC BLOOD PRESSURE: 102 MMHG | DIASTOLIC BLOOD PRESSURE: 66 MMHG | RESPIRATION RATE: 18 BRPM | OXYGEN SATURATION: 96 %

## 2024-11-13 LAB
% ALBUMIN: 42 % — SIGNIFICANT CHANGE UP
% ALPHA 1: 10.8 % — SIGNIFICANT CHANGE UP
% ALPHA 2: 20.3 % — SIGNIFICANT CHANGE UP
% BETA: 12.1 % — SIGNIFICANT CHANGE UP
% GAMMA: 14.8 % — SIGNIFICANT CHANGE UP
% M SPIKE: 3 % — SIGNIFICANT CHANGE UP
ALBUMIN SERPL ELPH-MCNC: 2.6 G/DL — LOW (ref 3.6–5.5)
ALBUMIN/GLOB SERPL ELPH: 0.7 RATIO — SIGNIFICANT CHANGE UP
ALPHA1 GLOB SERPL ELPH-MCNC: 0.7 G/DL — HIGH (ref 0.1–0.4)
ALPHA2 GLOB SERPL ELPH-MCNC: 1.3 G/DL — HIGH (ref 0.5–1)
B-GLOBULIN SERPL ELPH-MCNC: 0.8 G/DL — SIGNIFICANT CHANGE UP (ref 0.5–1)
GAMMA GLOBULIN: 0.9 G/DL — SIGNIFICANT CHANGE UP (ref 0.6–1.6)
M-SPIKE: 0.2 G/DL — HIGH (ref 0–0)
PROT PATTERN SERPL ELPH-IMP: SIGNIFICANT CHANGE UP
PROT SERPL-MCNC: 6.3 G/DL — SIGNIFICANT CHANGE UP (ref 6–8.3)
TSH SERPL-MCNC: 1.12 UIU/ML — SIGNIFICANT CHANGE UP (ref 0.27–4.2)

## 2024-11-13 PROCEDURE — 99232 SBSQ HOSP IP/OBS MODERATE 35: CPT | Mod: FS

## 2024-11-13 RX ORDER — AMOXICILLIN AND CLAVULANATE POTASSIUM 600; 42.9 MG/5ML; MG/5ML
1 POWDER, FOR SUSPENSION ORAL
Qty: 0 | Refills: 0 | DISCHARGE
Start: 2024-11-13 | End: 2024-11-18

## 2024-11-13 RX ORDER — CHLORHEXIDINE GLUCONATE 40 MG/ML
1 SOLUTION TOPICAL
Refills: 0 | Status: DISCONTINUED | OUTPATIENT
Start: 2024-11-13 | End: 2024-11-13

## 2024-11-13 RX ORDER — PANTOPRAZOLE SODIUM 40 MG/1
1 TABLET, DELAYED RELEASE ORAL
Qty: 0 | Refills: 0 | DISCHARGE
Start: 2024-11-13

## 2024-11-13 RX ORDER — DEXTROMETHORPHAN HBR. AND GUAIFENESIN 20; 200 MG/10ML; MG/10ML
10 SOLUTION ORAL
Qty: 0 | Refills: 0 | DISCHARGE
Start: 2024-11-13

## 2024-11-13 RX ORDER — FAMOTIDINE 10 MG/ML
1 INJECTION INTRAVENOUS
Refills: 0 | DISCHARGE

## 2024-11-13 RX ORDER — METOPROLOL TARTRATE 50 MG
1 TABLET ORAL
Qty: 0 | Refills: 0 | DISCHARGE
Start: 2024-11-13

## 2024-11-13 RX ORDER — SODIUM BICARBONATE 1 MEQ/ML
1 VIAL (ML) INTRAVENOUS
Qty: 0 | Refills: 0 | DISCHARGE
Start: 2024-11-13

## 2024-11-13 RX ADMIN — Medication 650 MILLIGRAM(S): at 05:36

## 2024-11-13 RX ADMIN — Medication 25 MILLIGRAM(S): at 05:31

## 2024-11-13 RX ADMIN — Medication 325 MILLIGRAM(S): at 12:09

## 2024-11-13 RX ADMIN — EZETIMIBE 10 MILLIGRAM(S): 10 TABLET ORAL at 12:09

## 2024-11-13 RX ADMIN — PANTOPRAZOLE SODIUM 40 MILLIGRAM(S): 40 TABLET, DELAYED RELEASE ORAL at 05:33

## 2024-11-13 RX ADMIN — Medication 650 MILLIGRAM(S): at 11:00

## 2024-11-13 RX ADMIN — Medication 650 MILLIGRAM(S): at 10:15

## 2024-11-13 RX ADMIN — Medication 25 MILLIGRAM(S): at 17:52

## 2024-11-13 RX ADMIN — Medication 650 MILLIGRAM(S): at 17:52

## 2024-11-13 RX ADMIN — AMOXICILLIN AND CLAVULANATE POTASSIUM 1 TABLET(S): 600; 42.9 POWDER, FOR SUSPENSION ORAL at 05:29

## 2024-11-13 RX ADMIN — Medication 650 MILLIGRAM(S): at 18:50

## 2024-11-13 RX ADMIN — AMOXICILLIN AND CLAVULANATE POTASSIUM 1 TABLET(S): 600; 42.9 POWDER, FOR SUSPENSION ORAL at 17:55

## 2024-11-13 RX ADMIN — Medication 650 MILLIGRAM(S): at 13:56

## 2024-11-13 RX ADMIN — HEPARIN SODIUM 5000 UNIT(S): 10000 INJECTION INTRAVENOUS; SUBCUTANEOUS at 05:30

## 2024-11-13 NOTE — DISCHARGE NOTE PROVIDER - NSDCCPCAREPLAN_GEN_ALL_CORE_FT
PRINCIPAL DISCHARGE DIAGNOSIS  Diagnosis: Anemia  Assessment and Plan of Treatment: Upon DC:  - needs echocardiogram outpatient  - finish course of antibiotics Augmentin until 11/18  - continue tessalon and guaifenesin as needed for cough  - started metoprolol tartrate 25 mg twice daily  - started sodium bicarbonate 650 mg three times daily  - follow up with GI  - follow up with oncology  - follow up with PMD      SECONDARY DISCHARGE DIAGNOSES  Diagnosis: Atrial fibrillation  Assessment and Plan of Treatment:     Diagnosis: Elevated troponin  Assessment and Plan of Treatment:      PRINCIPAL DISCHARGE DIAGNOSIS  Diagnosis: Anemia  Assessment and Plan of Treatment: Upon DC:  - needs echocardiogram outpatient  - finish course of antibiotics Augmentin until 11/18  - continue guaifenesin as needed for cough  - started metoprolol tartrate 25 mg twice daily  - started sodium bicarbonate 650 mg three times daily  - resume aspirin 81 mg daily  - follow up with GI  - follow up with oncology  - follow up with PMD      SECONDARY DISCHARGE DIAGNOSES  Diagnosis: Atrial fibrillation  Assessment and Plan of Treatment:     Diagnosis: Elevated troponin  Assessment and Plan of Treatment:      PRINCIPAL DISCHARGE DIAGNOSIS  Diagnosis: Anemia  Assessment and Plan of Treatment: Upon DC:  - needs echocardiogram outpatient  - finish course of antibiotics Augmentin until 11/18  - continue guaifenesin as needed for cough  - started metoprolol tartrate 25 mg twice daily  - started sodium bicarbonate 650 mg three times daily  - resume aspirin 81 mg daily  - follow up with GI  - follow up with oncology  - follow up with PMD      SECONDARY DISCHARGE DIAGNOSES  Diagnosis: Atrial fibrillation  Assessment and Plan of Treatment: Atrial Fibrillation  Atrial fibrillation is a type of irregular heartbeat (arrhythmia) where the heart quivers continuously in a chaotic pattern that makes the heart unable to pump blood normally. This can increase the risk for stroke, heart failure, and other heart-related conditions. Atrial fibrillation can be caused by a variety of conditions and may be temporary, intermittent, or permanent. Symptoms include feeling that your heart is beating rapidly or irregularly, chest discomfort, shortness of breath, or dizziness/lightheadedness that may be worse with exertion. Treatment is varied but may involve medication or electrical shock (cardioversion). Please go for outpatient echocardiogram.  SEEK IMMEDIATE MEDICAL CARE IF YOU HAVE ANY OF THE FOLLOWING SYMPTOMS: chest pain, shortness of breath, abdominal pain, sweating, vomiting, blood in vomit/bowel movements/urine, dizziness/lightheadedness, weakness or numbness to face/arm/leg, trouble speaking or understanding, facial droop.

## 2024-11-13 NOTE — DISCHARGE NOTE PROVIDER - CARE PROVIDER_API CALL
Toym Sheets  Internal Medicine  [address] [place] [address]  Phone: [phone]  Fax: [phone]  Follow Up Time: 2 weeks    Ricardo Kirkland  Gastroenterology  [address] [place] [address]  Phone: [phone]  Fax: [phone]  Follow Up Time: 2 weeks    Primo Corral  Internal Medicine  [address] [place] [address]  Phone: [phone]  Fax: [phone]  Follow Up Time: 1 week    Brent Cohen [place]  Pulmonary Disease  [address] [place] [address]  Phone: [phone]  Fax: [phone]  Follow Up Time: 2 weeks

## 2024-11-13 NOTE — DISCHARGE NOTE PROVIDER - CARE PROVIDERS DIRECT ADDRESSES
[email or web address]

## 2024-11-13 NOTE — DISCHARGE NOTE NURSING/CASE MANAGEMENT/SOCIAL WORK - PATIENT PORTAL LINK FT
You can access the FollowMyHealth Patient Portal offered by [facility] by registering at the following website: [email or web address]. By joining [employer]’s FollowMyHealth portal, you will also be able to view your health information using other applications (apps) compatible with our system.
You can access the FollowMyHealth Patient Portal offered by [facility] by registering at the following website: [email or web address]. By joining [employer]’s FollowMyHealth portal, you will also be able to view your health information using other applications (apps) compatible with our system.

## 2024-11-13 NOTE — DISCHARGE NOTE PROVIDER - NSDCMRMEDTOKEN_GEN_ALL_CORE_FT
Aspir 81 oral delayed release tablet: 1 tab(s) orally once a day  ezetimibe 10 mg oral tablet: 1 tab(s) orally once a day  famotidine 20 mg oral tablet: 1 tab(s) orally once a day  ferrous sulfate 324 mg (65 mg elemental iron) oral tablet: orally once a day   amoxicillin-clavulanate 500 mg-125 mg oral tablet: 1 tab(s) orally 2 times a day  Aspir 81 oral delayed release tablet: 1 tab(s) orally once a day  ezetimibe 10 mg oral tablet: 1 tab(s) orally once a day  ferrous sulfate 324 mg (65 mg elemental iron) oral tablet: orally once a day  guaiFENesin-dextromethorphan 100 mg-10 mg/5 mL oral liquid: 10 milliliter(s) orally 4 times a day As needed Cough  metoprolol tartrate 25 mg oral tablet: 1 tab(s) orally 2 times a day  pantoprazole 40 mg oral delayed release tablet: 1 tab(s) orally once a day (before a meal)  sodium bicarbonate 650 mg oral tablet: 1 tab(s) orally 3 times a day

## 2024-11-13 NOTE — DISCHARGE NOTE NURSING/CASE MANAGEMENT/SOCIAL WORK - FINANCIAL ASSISTANCE
[facility] provides services at a reduced cost to those who are determined to be eligible through [facility]’s financial assistance program. Information regarding [facility]’s financial assistance program can be found by going to [email or web address] or by calling [phone].
[facility] provides services at a reduced cost to those who are determined to be eligible through [facility]’s financial assistance program. Information regarding [facility]’s financial assistance program can be found by going to [email or web address] or by calling [phone].

## 2024-11-13 NOTE — DISCHARGE NOTE PROVIDER - PROVIDER TOKENS
PROVIDER:[TOKEN:[[id number]],FOLLOWUP:[2 weeks]],PROVIDER:[TOKEN:[[id number]],FOLLOWUP:[2 weeks]],PROVIDER:[TOKEN:[[id number]],FOLLOWUP:[1 week]],PROVIDER:[TOKEN:[[id number]],FOLLOWUP:[2 weeks]]

## 2024-11-13 NOTE — DISCHARGE NOTE PROVIDER - HOSPITAL COURSE
84F with PMH notable for hypetension, hyperlipidemia,  chronic anemia, frequent ED visits for anemia requiring transfusions, presenting from nursing home after found to have Hb of 5.8, also found to have CAP and new onset atrial fibrillation    #Community acquired pneumonia  #Complex parapneumonic effusion  Minimal symptoms, with dry cough for 1 week, labs notable for leukocytosis up to 18k, patient remarkably afebrile, suspect due to immunosenescence, on RA currently. CT with LLL opacity with associated loculated parapneumonic effusion  -Status post ceftriaxone/azithromycin (11/8)-->switched to Unasyn 3gr q12 hours as per ID recs, today is day 5 of antibiotics. Patient switched to oral Augmentin 500/125 until 11/18  -COVID-19/Flu/RSV-->negative  -Pulm consult for diagnostic thora-->recommending IR consultation given loculated effusion. Discussed with IR, no need for thoracentesis currently given small amount of effusion.     #Symptomatic anemia  #Anemia of chronic disease  Reports that she has anemia for three years now and has undergone extensive workup including C-scope 3 years ago which was unremarkable. Has chronically dark stool due to iron tablets. Denies any bleeding. It appears that her Hb has downtrended over the years from 10-->9-->7-8 currently. DDx includes occult chronic GI bleeding leading to EVER, however at that age, MM and MDS also need to be considered  -iron studies, B12, folate-->B12/folate adequate, iron studies with low TIBC and ferritin>1000 consistent with anemia of chronic disease  -FOBT-->negative samples so far  -SPEP/immunofixation, overall family not interested in aggressive measures  -Heme/onc consulted appreciate recs-->patient would need bone marrow biopsy to rule out MDS but not within GOC. Patient to continue Retacrit at 10,000 units weekly, she is due for it on Wednesdays  -GI consulted appreciate recs, monitor for clinical signs of bleeding for now, no plans for inpatient scope    #KINGA on CKD  -Improved with fluids  -Start sodium bicarbonate 650mg TID as patient's baseline bicarb <22 i/s/o CKD. Patient to continue as an outpatient.  -Overall appears euvolemic so cardiorenal less likely    #Atrial fibrillation, new onset  -Likely in the setting of anemia and infection  -Patient's HR persistently in the 100-110s with increasing metoprolol from 12.5 BID to TID, increase to 25mg BID today. Target HR <100 consistently in order to safely d/c to SNF  -Likely associated CHF, received two doses of Lasix with PRBC, will hold off on further Lasix as CT without clear evidence of pulmonary edema, mainly PNA noted and patient also with soft BP   -TTE read pending  -No indication for AC as per cards currently and with anemia requiring frequent transfusions  -Cards consulted appreciate recs    #NSTEMI type 2 secondary to demand ischemia  -Trops peaked and downtrended, stop checking    Medically stable for discharge to [facility]. 84F with PMH notable for hypetension, hyperlipidemia,  chronic anemia, frequent ED visits for anemia requiring transfusions, presenting from nursing home after found to have Hb of 5.8, also found to have CAP and new onset atrial fibrillation    #Community acquired pneumonia  #Complex parapneumonic effusion  Minimal symptoms, with dry cough for 1 week, labs notable for leukocytosis up to 18k, patient remarkably afebrile, suspect due to immunosenescence, on RA currently. CT with LLL opacity with associated loculated parapneumonic effusion  -Status post ceftriaxone/azithromycin (11/8)-->switched to Unasyn 3gr q12 hours as per ID recs, today is day 5 of antibiotics. Patient switched to oral Augmentin 500/125 until 11/18  -COVID-19/Flu/RSV-->negative  -Pulm consult for diagnostic thora-->recommending IR consultation given loculated effusion. Discussed with IR, no need for thoracentesis currently given small amount of effusion.     #Symptomatic anemia  #Anemia of chronic disease  Reports that she has anemia for three years now and has undergone extensive workup including C-scope 3 years ago which was unremarkable. Has chronically dark stool due to iron tablets. Denies any bleeding. It appears that her Hb has downtrended over the years from 10-->9-->7-8 currently. DDx includes occult chronic GI bleeding leading to EVER, however at that age, MM and MDS also need to be considered  -iron studies, B12, folate-->B12/folate adequate, iron studies with low TIBC and ferritin>1000 consistent with anemia of chronic disease  -FOBT-->negative samples so far  -SPEP/immunofixation, overall family not interested in aggressive measures  -Heme/onc consulted appreciate recs-->patient would need bone marrow biopsy to rule out MDS but not within GOC. Patient to continue Retacrit at 10,000 units weekly, she is due for it on Wednesdays  -GI consulted appreciate recs, monitor for clinical signs of bleeding for now, no plans for inpatient scope    #KINGA on CKD  -Improved with fluids  -Start sodium bicarbonate 650mg TID as patient's baseline bicarb <22 i/s/o CKD. Patient to continue as an outpatient.  -Overall appears euvolemic so cardiorenal less likely    #Atrial fibrillation, new onset  -Likely in the setting of anemia and infection  -Patient's HR persistently in the 100-110s with increasing metoprolol from 12.5 BID to TID, increase to 25mg BID today. Target HR <100 consistently in order to safely d/c to SNF  -Likely associated CHF, received two doses of Lasix with PRBC, will hold off on further Lasix as CT without clear evidence of pulmonary edema, mainly PNA noted and patient also with soft BP   -will need outpatient TTE  -No indication for AC as per cards currently and with anemia requiring frequent transfusions  -Cards consulted appreciate recs    #NSTEMI type 2 secondary to demand ischemia  -Trops peaked and downtrended, stop checking    Medically stable for discharge to [facility].    Upon DC:  - needs echocardiogram outpatient  - finish course of antibiotics Augmentin until 11/18  - continue tessalon and guaifenesin as needed for cough  - started metoprolol tartrate 25 mg twice daily  - started sodium bicarbonate 650 mg three times daily  - follow up with GI  - follow up with oncology  - follow up with PMD 84F with PMH notable for hypetension, hyperlipidemia,  chronic anemia, frequent ED visits for anemia requiring transfusions, presenting from nursing home after found to have Hb of 5.8, also found to have CAP and new onset atrial fibrillation    #Community acquired pneumonia  #Complex parapneumonic effusion  Minimal symptoms, with dry cough for 1 week, labs notable for leukocytosis up to 18k, patient remarkably afebrile, suspect due to immunosenescence, on RA currently. CT with LLL opacity with associated loculated parapneumonic effusion  -Status post ceftriaxone/azithromycin (11/8)-->switched to Unasyn 3gr q12 hours as per ID recs, today is day 5 of antibiotics. Patient switched to oral Augmentin 500/125 until 11/18  -COVID-19/Flu/RSV-->negative  -Pulm consult for diagnostic thora-->recommending IR consultation given loculated effusion. Discussed with IR, no need for thoracentesis currently given small amount of effusion.     #Symptomatic anemia  #Anemia of chronic disease  Reports that she has anemia for three years now and has undergone extensive workup including C-scope 3 years ago which was unremarkable. Has chronically dark stool due to iron tablets. Denies any bleeding. It appears that her Hb has downtrended over the years from 10-->9-->7-8 currently. DDx includes occult chronic GI bleeding leading to EVER, however at that age, MM and MDS also need to be considered  -iron studies, B12, folate-->B12/folate adequate, iron studies with low TIBC and ferritin>1000 consistent with anemia of chronic disease  -FOBT-->negative samples so far  -SPEP/immunofixation, overall family not interested in aggressive measures  -Heme/onc consulted appreciate recs-->patient would need bone marrow biopsy to rule out MDS but not within GOC. Patient to continue Retacrit at 10,000 units weekly, she is due for it on Wednesdays  -GI consulted appreciate recs, monitor for clinical signs of bleeding for now, no plans for inpatient scope    #KINGA on CKD  -Improved with fluids  -Start sodium bicarbonate 650mg TID as patient's baseline bicarb <22 i/s/o CKD. Patient to continue as an outpatient.  -Overall appears euvolemic so cardiorenal less likely    #Atrial fibrillation, new onset  -Likely in the setting of anemia and infection  -Patient's HR persistently in the 100-110s with increasing metoprolol from 12.5 BID to TID, increase to 25mg BID today. Target HR <100 consistently in order to safely d/c to SNF  -Likely associated CHF, received two doses of Lasix with PRBC, will hold off on further Lasix as CT without clear evidence of pulmonary edema, mainly PNA noted and patient also with soft BP   -will need outpatient TTE  -No indication for AC as per cards currently and with anemia requiring frequent transfusions  -Cards consulted appreciate recs    #NSTEMI type 2 secondary to demand ischemia  -Trops peaked and downtrended, stop checking    Medically stable for discharge to [facility].    Upon DC:  - needs echocardiogram outpatient  - finish course of antibiotics Augmentin until 11/18  - continue guaifenesin as needed for cough  - started metoprolol tartrate 25 mg twice daily  - started sodium bicarbonate 650 mg three times daily  - resume aspirin 81 mg  - follow up with GI  - follow up with oncology  - follow up with PMD 84F with PMH notable for hypertension, hyperlipidemia,  chronic anemia, frequent ED visits for anemia requiring transfusions, presenting from nursing home after found to have Hb of 5.8, also found to have CAP and new onset atrial fibrillation    #Community acquired pneumonia  #Complex parapneumonic effusion  Minimal symptoms, with dry cough for 1 week, labs notable for leukocytosis up to 18k, patient remarkably afebrile, suspect due to immunosenescence, on RA currently. CT with LLL opacity with associated loculated parapneumonic effusion  -Status post ceftriaxone/azithromycin (11/8)-->switched to Unasyn 3gr q12 hours as per ID recs, today is day 5 of antibiotics. Patient switched to oral Augmentin 500/125 until 11/18  -COVID-19/Flu/RSV-->negative  -Pulm consult for diagnostic thora-->recommending IR consultation given loculated effusion. Discussed with IR, no need for thoracentesis currently given small amount of effusion.     #Symptomatic anemia  #Anemia of chronic disease  Reports that she has anemia for three years now and has undergone extensive workup including C-scope 3 years ago which was unremarkable. Has chronically dark stool due to iron tablets. Denies any bleeding. It appears that her Hb has downtrended over the years from 10-->9-->7-8 currently. DDx includes occult chronic GI bleeding leading to EVER, however at that age, MM and MDS also need to be considered  -iron studies, B12, folate-->B12/folate adequate, iron studies with low TIBC and ferritin>1000 consistent with anemia of chronic disease  -FOBT-->negative samples so far  -SPEP/immunofixation, overall family not interested in aggressive measures  -Heme/onc consulted appreciate recs-->patient would need bone marrow biopsy to rule out MDS but not within GOC. Patient to continue Retacrit at 10,000 units weekly, she is due for it on Wednesdays  -GI consulted appreciate recs, monitor for clinical signs of bleeding for now, no plans for inpatient scope    #KINGA on CKD  -Improved with fluids  -Start sodium bicarbonate 650mg TID as patient's baseline bicarb <22 i/s/o CKD. Patient to continue as an outpatient.  -Overall appears euvolemic so cardiorenal less likely    #Atrial fibrillation, new onset  -Likely in the setting of anemia and infection  -Patient's HR persistently in the 100-110s with increasing metoprolol from 12.5 BID to TID, increase to 25mg BID today. Target HR <100 consistently in order to safely d/c to SNF  -Likely associated CHF, received two doses of Lasix with PRBC, will hold off on further Lasix as CT without clear evidence of pulmonary edema, mainly PNA noted and patient also with soft BP   -will need outpatient TTE  -No indication for AC as per cards currently and with anemia requiring frequent transfusions  -Cards consulted appreciate recs    #NSTEMI type 2 secondary to demand ischemia  -Trops peaked and downtrended, stop checking    Medically stable for discharge to [facility].    Upon DC:  - needs echocardiogram outpatient  - finish course of antibiotics Augmentin until 11/18  - continue guaifenesin as needed for cough  - started metoprolol tartrate 25 mg twice daily  - started sodium bicarbonate 650 mg three times daily  - resume aspirin 81 mg  - follow up with GI  - follow up with oncology  - follow up with PMD

## 2024-11-13 NOTE — DISCHARGE NOTE NURSING/CASE MANAGEMENT/SOCIAL WORK - NSDCPEFALRISK_GEN_ALL_CORE
For information on Fall & Injury Prevention, visit: [email or web address] OR  [email or web address] OR  https://www.cdc.gov/steadi/patient.html
For information on Fall & Injury Prevention, visit: [email or web address] OR  [email or web address] OR  https://www.cdc.gov/steadi/patient.html

## 2024-11-14 ENCOUNTER — TRANSCRIPTION ENCOUNTER (OUTPATIENT)
Age: 84
End: 2024-11-14

## 2024-11-14 ENCOUNTER — NON-APPOINTMENT (OUTPATIENT)
Age: 84
End: 2024-11-14

## 2024-11-14 LAB
CULTURE RESULTS: SIGNIFICANT CHANGE UP
SPECIMEN SOURCE: SIGNIFICANT CHANGE UP

## 2024-11-21 ENCOUNTER — TRANSCRIPTION ENCOUNTER (OUTPATIENT)
Age: 84
End: 2024-11-21

## 2024-11-22 DIAGNOSIS — I48.91 UNSPECIFIED ATRIAL FIBRILLATION: ICD-10-CM

## 2024-11-22 DIAGNOSIS — J18.9 PNEUMONIA, UNSPECIFIED ORGANISM: ICD-10-CM

## 2024-11-22 DIAGNOSIS — I44.7 LEFT BUNDLE-BRANCH BLOCK, UNSPECIFIED: ICD-10-CM

## 2024-11-22 DIAGNOSIS — Z79.82 LONG TERM (CURRENT) USE OF ASPIRIN: ICD-10-CM

## 2024-11-22 DIAGNOSIS — I50.9 HEART FAILURE, UNSPECIFIED: ICD-10-CM

## 2024-11-22 DIAGNOSIS — Z11.52 ENCOUNTER FOR SCREENING FOR COVID-19: ICD-10-CM

## 2024-11-22 DIAGNOSIS — I21.A1 MYOCARDIAL INFARCTION TYPE 2: ICD-10-CM

## 2024-11-22 DIAGNOSIS — Z66 DO NOT RESUSCITATE: ICD-10-CM

## 2024-11-22 DIAGNOSIS — N18.4 CHRONIC KIDNEY DISEASE, STAGE 4 (SEVERE): ICD-10-CM

## 2024-11-22 DIAGNOSIS — I95.9 HYPOTENSION, UNSPECIFIED: ICD-10-CM

## 2024-11-22 DIAGNOSIS — E78.5 HYPERLIPIDEMIA, UNSPECIFIED: ICD-10-CM

## 2024-11-22 DIAGNOSIS — Z53.29 PROCEDURE AND TREATMENT NOT CARRIED OUT BECAUSE OF PATIENT'S DECISION FOR OTHER REASONS: ICD-10-CM

## 2024-11-22 DIAGNOSIS — N17.9 ACUTE KIDNEY FAILURE, UNSPECIFIED: ICD-10-CM

## 2024-11-22 DIAGNOSIS — E87.20 ACIDOSIS, UNSPECIFIED: ICD-10-CM

## 2024-11-22 DIAGNOSIS — D50.9 IRON DEFICIENCY ANEMIA, UNSPECIFIED: ICD-10-CM

## 2024-11-22 DIAGNOSIS — J91.8 PLEURAL EFFUSION IN OTHER CONDITIONS CLASSIFIED ELSEWHERE: ICD-10-CM

## 2024-11-22 DIAGNOSIS — D84.81 IMMUNODEFICIENCY DUE TO CONDITIONS CLASSIFIED ELSEWHERE: ICD-10-CM

## 2024-12-05 ENCOUNTER — TRANSCRIPTION ENCOUNTER (OUTPATIENT)
Age: 84
End: 2024-12-05
